# Patient Record
Sex: FEMALE | Race: OTHER | HISPANIC OR LATINO
[De-identification: names, ages, dates, MRNs, and addresses within clinical notes are randomized per-mention and may not be internally consistent; named-entity substitution may affect disease eponyms.]

---

## 2017-03-08 ENCOUNTER — APPOINTMENT (OUTPATIENT)
Dept: INTERNAL MEDICINE | Facility: CLINIC | Age: 72
End: 2017-03-08

## 2017-04-03 ENCOUNTER — APPOINTMENT (OUTPATIENT)
Dept: INTERNAL MEDICINE | Facility: CLINIC | Age: 72
End: 2017-04-03

## 2017-04-03 VITALS — WEIGHT: 149 LBS | SYSTOLIC BLOOD PRESSURE: 119 MMHG | DIASTOLIC BLOOD PRESSURE: 80 MMHG | BODY MASS INDEX: 31.68 KG/M2

## 2017-04-03 VITALS — HEART RATE: 90 BPM

## 2017-05-04 ENCOUNTER — APPOINTMENT (OUTPATIENT)
Dept: OPHTHALMOLOGY | Facility: CLINIC | Age: 72
End: 2017-05-04

## 2017-05-04 DIAGNOSIS — H26.9 UNSPECIFIED CATARACT: ICD-10-CM

## 2017-05-24 ENCOUNTER — FORM ENCOUNTER (OUTPATIENT)
Age: 72
End: 2017-05-24

## 2017-05-25 ENCOUNTER — RX RENEWAL (OUTPATIENT)
Age: 72
End: 2017-05-25

## 2017-05-25 ENCOUNTER — APPOINTMENT (OUTPATIENT)
Dept: MAMMOGRAPHY | Facility: IMAGING CENTER | Age: 72
End: 2017-05-25

## 2017-05-25 ENCOUNTER — OUTPATIENT (OUTPATIENT)
Dept: OUTPATIENT SERVICES | Facility: HOSPITAL | Age: 72
LOS: 1 days | End: 2017-05-25
Payer: MEDICARE

## 2017-05-25 DIAGNOSIS — Z98.49 CATARACT EXTRACTION STATUS, UNSPECIFIED EYE: Chronic | ICD-10-CM

## 2017-05-25 DIAGNOSIS — Z00.00 ENCOUNTER FOR GENERAL ADULT MEDICAL EXAMINATION WITHOUT ABNORMAL FINDINGS: ICD-10-CM

## 2017-05-25 PROCEDURE — 77067 SCR MAMMO BI INCL CAD: CPT

## 2017-05-25 PROCEDURE — 77063 BREAST TOMOSYNTHESIS BI: CPT

## 2017-07-13 DIAGNOSIS — G56.00 CARPAL TUNNEL SYNDROME, UNSPECIFIED UPPER LIMB: ICD-10-CM

## 2017-07-21 ENCOUNTER — APPOINTMENT (OUTPATIENT)
Dept: INTERNAL MEDICINE | Facility: CLINIC | Age: 72
End: 2017-07-21

## 2017-07-21 VITALS
HEART RATE: 82 BPM | OXYGEN SATURATION: 97 % | BODY MASS INDEX: 31.71 KG/M2 | HEIGHT: 57.5 IN | DIASTOLIC BLOOD PRESSURE: 80 MMHG | WEIGHT: 149 LBS | SYSTOLIC BLOOD PRESSURE: 112 MMHG

## 2017-07-21 DIAGNOSIS — M25.562 PAIN IN LEFT KNEE: ICD-10-CM

## 2017-07-21 DIAGNOSIS — Z01.818 ENCOUNTER FOR OTHER PREPROCEDURAL EXAMINATION: ICD-10-CM

## 2017-07-26 LAB
25(OH)D3 SERPL-MCNC: 35.7 NG/ML
ALBUMIN SERPL ELPH-MCNC: 4.2 G/DL
ALP BLD-CCNC: 86 U/L
ALT SERPL-CCNC: 40 U/L
ANION GAP SERPL CALC-SCNC: 19 MMOL/L
AST SERPL-CCNC: 28 U/L
BASOPHILS # BLD AUTO: 0.01 K/UL
BASOPHILS NFR BLD AUTO: 0.2 %
BILIRUB SERPL-MCNC: 0.4 MG/DL
BUN SERPL-MCNC: 17 MG/DL
CALCIUM SERPL-MCNC: 9.1 MG/DL
CHLORIDE SERPL-SCNC: 103 MMOL/L
CHOLEST SERPL-MCNC: 155 MG/DL
CHOLEST/HDLC SERPL: 3.4 RATIO
CO2 SERPL-SCNC: 19 MMOL/L
CREAT SERPL-MCNC: 0.81 MG/DL
EOSINOPHIL # BLD AUTO: 0.12 K/UL
EOSINOPHIL NFR BLD AUTO: 2.5 %
GLUCOSE SERPL-MCNC: 95 MG/DL
HBA1C MFR BLD HPLC: 6 %
HCT VFR BLD CALC: 39.4 %
HDLC SERPL-MCNC: 46 MG/DL
HGB BLD-MCNC: 12.7 G/DL
IMM GRANULOCYTES NFR BLD AUTO: 0.2 %
LDLC SERPL CALC-MCNC: 92 MG/DL
LYMPHOCYTES # BLD AUTO: 1.78 K/UL
LYMPHOCYTES NFR BLD AUTO: 36.4 %
MAN DIFF?: NORMAL
MCHC RBC-ENTMCNC: 28.8 PG
MCHC RBC-ENTMCNC: 32.2 GM/DL
MCV RBC AUTO: 89.3 FL
MONOCYTES # BLD AUTO: 0.33 K/UL
MONOCYTES NFR BLD AUTO: 6.7 %
NEUTROPHILS # BLD AUTO: 2.64 K/UL
NEUTROPHILS NFR BLD AUTO: 54 %
PLATELET # BLD AUTO: 307 K/UL
POTASSIUM SERPL-SCNC: 4.5 MMOL/L
PROT SERPL-MCNC: 7.4 G/DL
RBC # BLD: 4.41 M/UL
RBC # FLD: 15.3 %
SODIUM SERPL-SCNC: 141 MMOL/L
TRIGL SERPL-MCNC: 83 MG/DL
TSH SERPL-ACNC: 1.38 UIU/ML
WBC # FLD AUTO: 4.89 K/UL

## 2017-09-13 ENCOUNTER — APPOINTMENT (OUTPATIENT)
Dept: OPHTHALMOLOGY | Facility: CLINIC | Age: 72
End: 2017-09-13
Payer: MEDICARE

## 2017-09-13 DIAGNOSIS — H40.003 PREGLAUCOMA, UNSPECIFIED, BILATERAL: ICD-10-CM

## 2017-09-13 PROCEDURE — 92012 INTRM OPH EXAM EST PATIENT: CPT

## 2017-09-13 PROCEDURE — ZZZZZ: CPT

## 2017-09-13 PROCEDURE — 92250 FUNDUS PHOTOGRAPHY W/I&R: CPT

## 2017-09-13 PROCEDURE — 92083 EXTENDED VISUAL FIELD XM: CPT

## 2017-09-14 ENCOUNTER — APPOINTMENT (OUTPATIENT)
Dept: OPHTHALMOLOGY | Facility: CLINIC | Age: 72
End: 2017-09-14

## 2017-11-17 ENCOUNTER — APPOINTMENT (OUTPATIENT)
Dept: OPHTHALMOLOGY | Facility: CLINIC | Age: 72
End: 2017-11-17
Payer: MEDICARE

## 2017-11-17 PROCEDURE — 92012 INTRM OPH EXAM EST PATIENT: CPT

## 2017-11-17 PROCEDURE — 92083 EXTENDED VISUAL FIELD XM: CPT

## 2017-11-24 ENCOUNTER — RX RENEWAL (OUTPATIENT)
Age: 72
End: 2017-11-24

## 2017-11-28 ENCOUNTER — APPOINTMENT (OUTPATIENT)
Dept: OPHTHALMOLOGY | Facility: CLINIC | Age: 72
End: 2017-11-28
Payer: MEDICARE

## 2017-11-28 PROCEDURE — 92012 INTRM OPH EXAM EST PATIENT: CPT

## 2017-11-28 PROCEDURE — 92133 CPTRZD OPH DX IMG PST SGM ON: CPT

## 2017-11-28 PROCEDURE — 92083 EXTENDED VISUAL FIELD XM: CPT

## 2017-12-28 ENCOUNTER — APPOINTMENT (OUTPATIENT)
Dept: INTERNAL MEDICINE | Facility: CLINIC | Age: 72
End: 2017-12-28
Payer: MEDICARE

## 2017-12-28 VITALS — SYSTOLIC BLOOD PRESSURE: 120 MMHG | DIASTOLIC BLOOD PRESSURE: 79 MMHG | HEART RATE: 86 BPM | OXYGEN SATURATION: 96 %

## 2017-12-28 PROCEDURE — 99213 OFFICE O/P EST LOW 20 MIN: CPT

## 2017-12-28 RX ORDER — AMOXICILLIN 500 MG/1
500 CAPSULE ORAL
Qty: 21 | Refills: 0 | Status: DISCONTINUED | COMMUNITY
Start: 2017-08-17 | End: 2017-12-28

## 2018-05-29 ENCOUNTER — RX RENEWAL (OUTPATIENT)
Age: 73
End: 2018-05-29

## 2018-06-04 ENCOUNTER — APPOINTMENT (OUTPATIENT)
Dept: INTERNAL MEDICINE | Facility: CLINIC | Age: 73
End: 2018-06-04
Payer: MEDICARE

## 2018-06-04 VITALS
HEART RATE: 94 BPM | OXYGEN SATURATION: 98 % | SYSTOLIC BLOOD PRESSURE: 120 MMHG | DIASTOLIC BLOOD PRESSURE: 80 MMHG | WEIGHT: 150 LBS | HEIGHT: 57.5 IN | BODY MASS INDEX: 31.92 KG/M2

## 2018-06-04 PROCEDURE — 99214 OFFICE O/P EST MOD 30 MIN: CPT

## 2018-06-10 NOTE — PHYSICAL EXAM
[No Acute Distress] : no acute distress [Well Nourished] : well nourished [Well Developed] : well developed [Well-Appearing] : well-appearing [Supple] : supple [No Respiratory Distress] : no respiratory distress  [Clear to Auscultation] : lungs were clear to auscultation bilaterally [No Accessory Muscle Use] : no accessory muscle use [Normal Rate] : normal rate  [Regular Rhythm] : with a regular rhythm [Normal S1, S2] : normal S1 and S2 [No Murmur] : no murmur heard [Soft] : abdomen soft [Non Tender] : non-tender [Non-distended] : non-distended [No Masses] : no abdominal mass palpated [No HSM] : no HSM [Normal Bowel Sounds] : normal bowel sounds

## 2018-06-11 ENCOUNTER — FORM ENCOUNTER (OUTPATIENT)
Age: 73
End: 2018-06-11

## 2018-06-12 ENCOUNTER — OUTPATIENT (OUTPATIENT)
Dept: OUTPATIENT SERVICES | Facility: HOSPITAL | Age: 73
LOS: 1 days | End: 2018-06-12
Payer: MEDICARE

## 2018-06-12 ENCOUNTER — APPOINTMENT (OUTPATIENT)
Dept: MAMMOGRAPHY | Facility: IMAGING CENTER | Age: 73
End: 2018-06-12
Payer: MEDICARE

## 2018-06-12 DIAGNOSIS — Z00.8 ENCOUNTER FOR OTHER GENERAL EXAMINATION: ICD-10-CM

## 2018-06-12 DIAGNOSIS — Z98.49 CATARACT EXTRACTION STATUS, UNSPECIFIED EYE: Chronic | ICD-10-CM

## 2018-06-12 PROCEDURE — 77067 SCR MAMMO BI INCL CAD: CPT

## 2018-06-12 PROCEDURE — 77063 BREAST TOMOSYNTHESIS BI: CPT

## 2018-06-12 PROCEDURE — 77063 BREAST TOMOSYNTHESIS BI: CPT | Mod: 26

## 2018-06-12 PROCEDURE — 77067 SCR MAMMO BI INCL CAD: CPT | Mod: 26

## 2018-07-06 NOTE — ASSESSMENT
[FreeTextEntry1] : 71 yo female with h/o as above including hypothyroidism and osteopenia here for f/up and to address a few issues.\par 1.  GI -intermittent GERD without warning signs except for age, but not occurring all the time; would advise trial pepcid, referred to GI DR. Benitez/group for colonoscopy and possible EGD\par 2. Gyn - rx mammo given\par 3.  Endo - rx dexa given to f/up osteopenia\par 4.  MSK - will f/up with ortho for foot/ankle issues\par 5.  RTO few months CPE when due\par \par \par

## 2018-07-06 NOTE — HISTORY OF PRESENT ILLNESS
[FreeTextEntry8] : 73 yo female with h/o as below including hypothyroidism, preDM, osteopenia, here for few issues.\par For past four months has been developing heartburn, occurs with certain foods, including tuna fish, won't occur with other foods.  Takes tums with relief.  No abdominal pain, no nausea/vomiting, no hematochezia, no melena, no dysphagia, no odynophagia, no weight loss, no early satiety.  No travel out of the country.  No contacts with h pylori.  \par Needs rx mammo, not sure if needs dexa, also needs referral for colonoscopy.\par Went to orthopedist for pain in left foot (hurts when using flats), and had cut bottom of toe so given cream by ortho, but still has cut, not sure if cream helped.  Then developed swelling left ankle, not sure what it is, not painful.  In the past given brace by ortho to prevent tendinitis on right foot.  \par No other active issues.\par

## 2018-07-06 NOTE — REVIEW OF SYSTEMS
[Negative] : Heme/Lymph [FreeTextEntry7] : see hpi [FreeTextEntry9] : see hpi [de-identified] : see hpi

## 2018-08-26 ENCOUNTER — EMERGENCY (EMERGENCY)
Facility: HOSPITAL | Age: 73
LOS: 1 days | End: 2018-08-26
Attending: EMERGENCY MEDICINE
Payer: MEDICARE

## 2018-08-26 VITALS — WEIGHT: 139.99 LBS | HEIGHT: 62 IN

## 2018-08-26 DIAGNOSIS — Z98.49 CATARACT EXTRACTION STATUS, UNSPECIFIED EYE: Chronic | ICD-10-CM

## 2018-08-26 LAB
ALBUMIN SERPL ELPH-MCNC: 4.1 G/DL — SIGNIFICANT CHANGE UP (ref 3.3–5)
ALP SERPL-CCNC: 79 U/L — SIGNIFICANT CHANGE UP (ref 40–120)
ALT FLD-CCNC: 38 U/L — SIGNIFICANT CHANGE UP (ref 10–45)
ANION GAP SERPL CALC-SCNC: 13 MMOL/L — SIGNIFICANT CHANGE UP (ref 5–17)
APPEARANCE UR: ABNORMAL
AST SERPL-CCNC: 26 U/L — SIGNIFICANT CHANGE UP (ref 10–40)
BACTERIA # UR AUTO: ABNORMAL /HPF
BASOPHILS # BLD AUTO: 0 K/UL — SIGNIFICANT CHANGE UP (ref 0–0.2)
BASOPHILS NFR BLD AUTO: 0.6 % — SIGNIFICANT CHANGE UP (ref 0–2)
BILIRUB SERPL-MCNC: 0.4 MG/DL — SIGNIFICANT CHANGE UP (ref 0.2–1.2)
BILIRUB UR-MCNC: NEGATIVE — SIGNIFICANT CHANGE UP
BUN SERPL-MCNC: 14 MG/DL — SIGNIFICANT CHANGE UP (ref 7–23)
CALCIUM SERPL-MCNC: 9.6 MG/DL — SIGNIFICANT CHANGE UP (ref 8.4–10.5)
CHLORIDE SERPL-SCNC: 105 MMOL/L — SIGNIFICANT CHANGE UP (ref 96–108)
CO2 SERPL-SCNC: 23 MMOL/L — SIGNIFICANT CHANGE UP (ref 22–31)
COLOR SPEC: SIGNIFICANT CHANGE UP
COMMENT - URINE: SIGNIFICANT CHANGE UP
CREAT SERPL-MCNC: 0.85 MG/DL — SIGNIFICANT CHANGE UP (ref 0.5–1.3)
DIFF PNL FLD: ABNORMAL
EOSINOPHIL # BLD AUTO: 0.1 K/UL — SIGNIFICANT CHANGE UP (ref 0–0.5)
EOSINOPHIL NFR BLD AUTO: 1.8 % — SIGNIFICANT CHANGE UP (ref 0–6)
EPI CELLS # UR: SIGNIFICANT CHANGE UP /HPF
GAS PNL BLDV: SIGNIFICANT CHANGE UP
GLUCOSE SERPL-MCNC: 97 MG/DL — SIGNIFICANT CHANGE UP (ref 70–99)
GLUCOSE UR QL: NEGATIVE — SIGNIFICANT CHANGE UP
HCT VFR BLD CALC: 41 % — SIGNIFICANT CHANGE UP (ref 34.5–45)
HGB BLD-MCNC: 13.6 G/DL — SIGNIFICANT CHANGE UP (ref 11.5–15.5)
KETONES UR-MCNC: NEGATIVE — SIGNIFICANT CHANGE UP
LEUKOCYTE ESTERASE UR-ACNC: ABNORMAL
LYMPHOCYTES # BLD AUTO: 2 K/UL — SIGNIFICANT CHANGE UP (ref 1–3.3)
LYMPHOCYTES # BLD AUTO: 30.6 % — SIGNIFICANT CHANGE UP (ref 13–44)
MCHC RBC-ENTMCNC: 29 PG — SIGNIFICANT CHANGE UP (ref 27–34)
MCHC RBC-ENTMCNC: 33.2 GM/DL — SIGNIFICANT CHANGE UP (ref 32–36)
MCV RBC AUTO: 87.2 FL — SIGNIFICANT CHANGE UP (ref 80–100)
MONOCYTES # BLD AUTO: 0.4 K/UL — SIGNIFICANT CHANGE UP (ref 0–0.9)
MONOCYTES NFR BLD AUTO: 6.1 % — SIGNIFICANT CHANGE UP (ref 2–14)
NEUTROPHILS # BLD AUTO: 4 K/UL — SIGNIFICANT CHANGE UP (ref 1.8–7.4)
NEUTROPHILS NFR BLD AUTO: 60.9 % — SIGNIFICANT CHANGE UP (ref 43–77)
NITRITE UR-MCNC: NEGATIVE — SIGNIFICANT CHANGE UP
PH UR: 7 — SIGNIFICANT CHANGE UP (ref 5–8)
PLATELET # BLD AUTO: 294 K/UL — SIGNIFICANT CHANGE UP (ref 150–400)
POTASSIUM SERPL-MCNC: 4.3 MMOL/L — SIGNIFICANT CHANGE UP (ref 3.5–5.3)
POTASSIUM SERPL-SCNC: 4.3 MMOL/L — SIGNIFICANT CHANGE UP (ref 3.5–5.3)
PROT SERPL-MCNC: 7.9 G/DL — SIGNIFICANT CHANGE UP (ref 6–8.3)
PROT UR-MCNC: NEGATIVE — SIGNIFICANT CHANGE UP
RBC # BLD: 4.7 M/UL — SIGNIFICANT CHANGE UP (ref 3.8–5.2)
RBC # FLD: 12.8 % — SIGNIFICANT CHANGE UP (ref 10.3–14.5)
RBC CASTS # UR COMP ASSIST: SIGNIFICANT CHANGE UP /HPF (ref 0–2)
SODIUM SERPL-SCNC: 141 MMOL/L — SIGNIFICANT CHANGE UP (ref 135–145)
SP GR SPEC: 1.01 — LOW (ref 1.01–1.02)
UROBILINOGEN FLD QL: NEGATIVE — SIGNIFICANT CHANGE UP
WBC # BLD: 6.6 K/UL — SIGNIFICANT CHANGE UP (ref 3.8–10.5)
WBC # FLD AUTO: 6.6 K/UL — SIGNIFICANT CHANGE UP (ref 3.8–10.5)
WBC UR QL: >50 /HPF (ref 0–5)

## 2018-08-26 PROCEDURE — 74176 CT ABD & PELVIS W/O CONTRAST: CPT | Mod: 26

## 2018-08-26 PROCEDURE — 99218: CPT

## 2018-08-26 RX ORDER — LEVOTHYROXINE SODIUM 125 MCG
75 TABLET ORAL DAILY
Qty: 0 | Refills: 0 | Status: DISCONTINUED | OUTPATIENT
Start: 2018-08-26 | End: 2018-08-30

## 2018-08-26 RX ORDER — SODIUM CHLORIDE 9 MG/ML
3 INJECTION INTRAMUSCULAR; INTRAVENOUS; SUBCUTANEOUS EVERY 8 HOURS
Qty: 0 | Refills: 0 | Status: DISCONTINUED | OUTPATIENT
Start: 2018-08-26 | End: 2018-08-30

## 2018-08-26 RX ORDER — ACETAMINOPHEN 500 MG
975 TABLET ORAL ONCE
Qty: 0 | Refills: 0 | Status: COMPLETED | OUTPATIENT
Start: 2018-08-26 | End: 2018-08-26

## 2018-08-26 RX ORDER — CEFTRIAXONE 500 MG/1
1 INJECTION, POWDER, FOR SOLUTION INTRAMUSCULAR; INTRAVENOUS EVERY 12 HOURS
Qty: 0 | Refills: 0 | Status: DISCONTINUED | OUTPATIENT
Start: 2018-08-27 | End: 2018-08-30

## 2018-08-26 RX ORDER — CEFTRIAXONE 500 MG/1
1 INJECTION, POWDER, FOR SOLUTION INTRAMUSCULAR; INTRAVENOUS ONCE
Qty: 0 | Refills: 0 | Status: COMPLETED | OUTPATIENT
Start: 2018-08-26 | End: 2018-08-26

## 2018-08-26 RX ORDER — SODIUM CHLORIDE 9 MG/ML
1000 INJECTION INTRAMUSCULAR; INTRAVENOUS; SUBCUTANEOUS ONCE
Qty: 0 | Refills: 0 | Status: COMPLETED | OUTPATIENT
Start: 2018-08-26 | End: 2018-08-26

## 2018-08-26 RX ADMIN — CEFTRIAXONE 100 GRAM(S): 500 INJECTION, POWDER, FOR SOLUTION INTRAMUSCULAR; INTRAVENOUS at 14:34

## 2018-08-26 RX ADMIN — SODIUM CHLORIDE 3 MILLILITER(S): 9 INJECTION INTRAMUSCULAR; INTRAVENOUS; SUBCUTANEOUS at 22:01

## 2018-08-26 RX ADMIN — Medication 975 MILLIGRAM(S): at 14:32

## 2018-08-26 RX ADMIN — SODIUM CHLORIDE 2000 MILLILITER(S): 9 INJECTION INTRAMUSCULAR; INTRAVENOUS; SUBCUTANEOUS at 12:05

## 2018-08-26 RX ADMIN — SODIUM CHLORIDE 1000 MILLILITER(S): 9 INJECTION INTRAMUSCULAR; INTRAVENOUS; SUBCUTANEOUS at 15:00

## 2018-08-26 RX ADMIN — Medication 975 MILLIGRAM(S): at 12:05

## 2018-08-26 NOTE — ED ADULT NURSE NOTE - OBJECTIVE STATEMENT
73 y/o female presenting to the ED via walking in complaining of left flank pain x 4 days. Patient states developed burning with urination 4 days ago along with left flank pain that has been getting progressively more painful. Hx of osteopenia on Calcium, Sjogren's, and hypothyroid. Patient denies fevers, n/v/d, numbness, tingling, SOB, chest pain. Denies blood in urine. A&OX3. Safety and comfort measures provided.

## 2018-08-26 NOTE — ED ADULT NURSE NOTE - NSIMPLEMENTINTERV_GEN_ALL_ED
Implemented All Universal Safety Interventions:  [place] to call system. Call bell, personal items and telephone within reach. Instruct patient to call for assistance. Room bathroom lighting operational. Non-slip footwear when patient is off stretcher. Physically safe environment: no spills, clutter or unnecessary equipment. Stretcher in lowest position, wheels locked, appropriate side rails in place.

## 2018-08-26 NOTE — ED PROVIDER NOTE - MEDICAL DECISION MAKING DETAILS
Woody Bassett (Resident): Healthy 73 y/o p/w flank pain and dysuria - looks well, non toxic - concern for pyelo but given nature of pain patient describing, will CT to ensure not a septic stone - will check labs, urine, pain control - low concern for dissection given history, normotensive and dysuria - will be a good cdu candidate vs d/c if simple pyelo

## 2018-08-26 NOTE — ED CDU PROVIDER INITIAL DAY NOTE - ATTENDING CONTRIBUTION TO CARE
attending Brandie: pt with pyelonephritis. Plan for CDU for pain control, IV abx, vital signs q4h and  frequent re-evaluations

## 2018-08-26 NOTE — ED CDU PROVIDER INITIAL DAY NOTE - OBJECTIVE STATEMENT
71 y/o female with PMH of osteopenia on Calcium, Sjogren's syndrome, and hypothyroid presents with flan pain. Per patient, developed some pain in her L flank - tried applying Bengay and taking tylenol w/o relief. Pain started 4 days ago, and now has gotten worse. States she has burning on urination as well. Was unable to get PMD appointment, so been taking Azo. Still having burning on urination. Denies f/c, CP, SOB, N/V/D. No hx of renal colic.   PMD: Dr. Nereyda Coleman    In the ED VSS. Labs unremarkable.  UA >50 WBC and large leuks.  CTAP negative for stone.  Patient started on ceftriaxone.  Plan to place in the CDU overnight for IV antibiotics and close observation

## 2018-08-26 NOTE — ED CDU PROVIDER INITIAL DAY NOTE - PROGRESS NOTE DETAILS
Patient seen at bedside in NAD.  VSS.  Patient resting comfortably without complaints.  Pain well controlled. Afebrile.  -Migue Navarrete PA-C CDU PROGRESS NOTE SHARAD MORE: Received pt at 1900 sign-out. Pt resting in stretcher in NAD. Case/plan reviewed. VSS. Pt Ambulatory around unit with steady gait. S1 S2 noted, RRR, lungs CTA b/l, BS x4 with soft, nontender abdomen. Pt without complaints, declines pain medication. Will continue to monitor overnight. CDU PROGRESS NOTE PA DERIK: Pt resting comfortably, feeling well without complaint. NAD, VSS

## 2018-08-26 NOTE — ED PROVIDER NOTE - ATTENDING CONTRIBUTION TO CARE
attending Brandie: 72yF p/w several days flank pain and dysuria. Well-appearing on exam. Not septic. Likely pyelo, will also get CT eval for stone and reassess

## 2018-08-26 NOTE — ED PROVIDER NOTE - OBJECTIVE STATEMENT
71 y/o female with PMH of osteopenia on Calcium, Sjogren's syndrome, and hypothyroid presents with flan pain. Per patient, developed some pain in her L flank - tried applying Bengay and taking tylenol w/o relief. Pain started 4 days ago, and now has gotten worse. States she has burning on urination as well. Was unable to get PMD appointment, so been taking Azo. Still having burning on urination. Denies f/c, CP, SOB, N/V/D. No hx of renal colic.   PMD: Dr. Nereyda Coleman

## 2018-08-27 VITALS
RESPIRATION RATE: 18 BRPM | HEART RATE: 66 BPM | TEMPERATURE: 98 F | SYSTOLIC BLOOD PRESSURE: 126 MMHG | DIASTOLIC BLOOD PRESSURE: 84 MMHG | OXYGEN SATURATION: 96 %

## 2018-08-27 PROCEDURE — 99217: CPT

## 2018-08-27 PROCEDURE — 96376 TX/PRO/DX INJ SAME DRUG ADON: CPT

## 2018-08-27 PROCEDURE — 99284 EMERGENCY DEPT VISIT MOD MDM: CPT | Mod: 25

## 2018-08-27 PROCEDURE — 84295 ASSAY OF SERUM SODIUM: CPT

## 2018-08-27 PROCEDURE — 85014 HEMATOCRIT: CPT

## 2018-08-27 PROCEDURE — 82330 ASSAY OF CALCIUM: CPT

## 2018-08-27 PROCEDURE — 83605 ASSAY OF LACTIC ACID: CPT

## 2018-08-27 PROCEDURE — 80053 COMPREHEN METABOLIC PANEL: CPT

## 2018-08-27 PROCEDURE — 87086 URINE CULTURE/COLONY COUNT: CPT

## 2018-08-27 PROCEDURE — 81001 URINALYSIS AUTO W/SCOPE: CPT

## 2018-08-27 PROCEDURE — G0378: CPT

## 2018-08-27 PROCEDURE — 82803 BLOOD GASES ANY COMBINATION: CPT

## 2018-08-27 PROCEDURE — 96361 HYDRATE IV INFUSION ADD-ON: CPT

## 2018-08-27 PROCEDURE — 87186 SC STD MICRODIL/AGAR DIL: CPT

## 2018-08-27 PROCEDURE — 82947 ASSAY GLUCOSE BLOOD QUANT: CPT

## 2018-08-27 PROCEDURE — 82435 ASSAY OF BLOOD CHLORIDE: CPT

## 2018-08-27 PROCEDURE — 85027 COMPLETE CBC AUTOMATED: CPT

## 2018-08-27 PROCEDURE — 96365 THER/PROPH/DIAG IV INF INIT: CPT

## 2018-08-27 PROCEDURE — 74176 CT ABD & PELVIS W/O CONTRAST: CPT

## 2018-08-27 PROCEDURE — 84132 ASSAY OF SERUM POTASSIUM: CPT

## 2018-08-27 RX ORDER — CEFUROXIME AXETIL 250 MG
3 TABLET ORAL
Qty: 90 | Refills: 0 | OUTPATIENT
Start: 2018-08-27 | End: 2018-09-05

## 2018-08-27 RX ORDER — CEFUROXIME AXETIL 250 MG
1 TABLET ORAL
Qty: 30 | Refills: 0
Start: 2018-08-27 | End: 2018-09-05

## 2018-08-27 RX ADMIN — Medication 75 MICROGRAM(S): at 06:56

## 2018-08-27 RX ADMIN — SODIUM CHLORIDE 3 MILLILITER(S): 9 INJECTION INTRAMUSCULAR; INTRAVENOUS; SUBCUTANEOUS at 05:14

## 2018-08-27 RX ADMIN — CEFTRIAXONE 1 GRAM(S): 500 INJECTION, POWDER, FOR SOLUTION INTRAMUSCULAR; INTRAVENOUS at 03:08

## 2018-08-27 RX ADMIN — CEFTRIAXONE 100 GRAM(S): 500 INJECTION, POWDER, FOR SOLUTION INTRAMUSCULAR; INTRAVENOUS at 02:38

## 2018-08-27 NOTE — ED CDU PROVIDER DISPOSITION NOTE - PLAN OF CARE
1. Follow up with your PMD within 48-72 hours.   2. Take ceftin 500mg TID for 10 days. Increase fluids. Motrin 600mg every 8 hours with food for pain.   3. Worsening pain, new fever, chills, nausea, vomiting return to ER

## 2018-08-27 NOTE — ED CDU PROVIDER SUBSEQUENT DAY NOTE - MEDICAL DECISION MAKING DETAILS
Attending Fabian Ordonez DO: 71 yo female admitted to CDU for flank pain and dysuria, +UA, being treated for pyelonephritis. Pt states "I feel better:. Has no complaints. VSS. PE: Well appearing, NAD, MMM, RRR, lungs clear b/l, ab soft, NT/ND, no CVA tenderness, moving all extremities without pain or limitation, no focal neuro deficits, no rashes, normal mood and affect. Labs unremarkable. Will dc home on antibiotics.

## 2018-08-27 NOTE — ED CDU PROVIDER SUBSEQUENT DAY NOTE - HISTORY
No interval change from previous note. Pt resting comfortably, feeling well without complaint. NAD, VSS.

## 2018-08-27 NOTE — ED CDU PROVIDER SUBSEQUENT DAY NOTE - ENMT NEGATIVE STATEMENT, MLM
Ears: no ear pain and no hearing problems.Nose: no nasal congestion and no nasal drainage.Mouth/Throat: no dysphagia, no hoarseness and no throat pain.Neck: no lumps, no pain, no stiffness and no swollen glands Ears: no ear pain and no hearing problems. Nose: no nasal congestion and no nasal drainage.Mouth/Throat: no dysphagia, no hoarseness and no throat pain.Neck: no lumps, no pain, no stiffness and no swollen glands

## 2018-08-27 NOTE — ED CDU PROVIDER SUBSEQUENT DAY NOTE - PROGRESS NOTE DETAILS
No interval change from previous note. Pt resting comfortably, feeling well without complaint. NAD, VSS. Patient resting, NAD, received Ceftriaxone, without complaints, will continue to monitor. Patient resting, no back pain or dysuria, feeling well, tolerating PO intake. will continue to monitor, will d/c with PMD follow up and ceftin 500 TID for 10 days

## 2018-08-27 NOTE — ED CDU PROVIDER DISPOSITION NOTE - CLINICAL COURSE
71 y/o female with PMH of osteopenia on Calcium, Sjogren's syndrome, and hypothyroid presents with flan pain. Per patient, developed some pain in her L flank - tried applying Bengay and taking tylenol w/o relief. Pain started 4 days ago, and now has gotten worse. States she has burning on urination as well. Was unable to get PMD appointment, so been taking Azo. Still having burning on urination. Denies f/c, CP, SOB, N/V/D. No hx of renal colic.   PMD: Dr. Nereyda Coleman  In the ED VSS. Labs unremarkable.  UA >50 WBC and large leuks.  CTAP negative for stone.  Patient started on ceftriaxone.  Plan to place in the CDU overnight for IV antibiotics and close observation 71 y/o female with PMH of osteopenia on Calcium, Sjogren's syndrome, and hypothyroid presents with flank pain. Per patient, developed some pain in her L flank - tried applying Bengay and taking tylenol w/o relief. Pain started 4 days ago, and now has gotten worse. States she has burning on urination as well. Was unable to get PMD appointment, so been taking Azo. Still having burning on urination. Denies f/c, CP, SOB, N/V/D. No hx of renal colic.   PMD: Dr. Nereyda Coleman  In the ED VSS. Labs unremarkable.  UA >50 WBC and large leuks.  CTAP negative for stone.  Patient started on ceftriaxone.  Plan to place in the CDU overnight for IV antibiotics and close observation 73 y/o female with PMH of osteopenia on Calcium, Sjogren's syndrome, and hypothyroid presents with flank pain. Per patient, developed some pain in her L flank - tried applying Bengay and taking tylenol w/o relief. Pain started 4 days ago, and now has gotten worse. States she has burning on urination as well. Was unable to get PMD appointment, so been taking Azo. Still having burning on urination. Denies f/c, CP, SOB, N/V/D. No hx of renal colic.   PMD: Dr. Nereyda Coleman  In the ED VSS. Labs unremarkable.  UA >50 WBC and large leuks.  CTAP negative for stone.  Patient started on ceftriaxone.  Plan to place in the CDU overnight for IV antibiotics and close observation    Pt feeling well overnight, no back pain, no dysuria, tolerating PO well. Will f.u with PMD and ceftin 500 TID for 10 days, case discussed with Dr. Ordonez.

## 2018-08-27 NOTE — ED ADULT NURSE REASSESSMENT NOTE - NS ED NURSE REASSESS COMMENT FT1
10.00 Pt was Reviewed by Dr José Luis TOLEDO MD  Pt is Discharged  ML out SHARAD Velez explained the  Follow up care & gave the Discharge Summary Pt verbalized the understanding on follow up care Pt has stable vitals steady gait A&OX4  Stable to go home
15.30 Hrs Received  the pt from erik for the management of pyonephritis. Pt is oriented to the unit.  Has stable vitals afebrile here   . Pt states her pain under control . comfort care & safety measures initiated.  Continue to monitor
Pt received from PHILIPP Montejo. Pt oriented to CDU & plan of care was discussed. Pt denies any discomfort urinating, frequency, or any tenderness. Urine is yellow and non cloudy. Will give IV abx at 0230 as per MAR. Safety & comfort measures maintained. Call bell in reach. Will continue to monitor.
07.00 Am Received Report from PHILIPP Arauz/David Schmitz  07.30 Am Pt reassessed Pt A&OX4 ambulatory has steady gait  denies N /V/D fever chills CP sob pain has stable vitals afebrile here IV site looks clean dry intact no signs of infiltration noted  Comfort care & safety measures continued  Pt is awaiting for CDU eval & Decision

## 2018-08-29 ENCOUNTER — APPOINTMENT (OUTPATIENT)
Dept: INTERNAL MEDICINE | Facility: CLINIC | Age: 73
End: 2018-08-29
Payer: MEDICARE

## 2018-08-29 PROCEDURE — 99213 OFFICE O/P EST LOW 20 MIN: CPT

## 2018-08-29 NOTE — ED POST DISCHARGE NOTE - RESULT SUMMARY
UCx + E.coli 50-99k, Sensitive to 2nd gen cephalosporins, pt DC'ed on cefuroxime. Pt on appropriate abx, no need for further contact. -Manoj Dang PA-C

## 2018-08-30 VITALS
TEMPERATURE: 98.6 F | RESPIRATION RATE: 14 BRPM | DIASTOLIC BLOOD PRESSURE: 70 MMHG | HEART RATE: 78 BPM | SYSTOLIC BLOOD PRESSURE: 110 MMHG

## 2018-10-03 ENCOUNTER — APPOINTMENT (OUTPATIENT)
Dept: INTERNAL MEDICINE | Facility: CLINIC | Age: 73
End: 2018-10-03
Payer: MEDICARE

## 2018-10-03 VITALS
HEIGHT: 57.25 IN | SYSTOLIC BLOOD PRESSURE: 120 MMHG | WEIGHT: 143 LBS | DIASTOLIC BLOOD PRESSURE: 70 MMHG | HEART RATE: 78 BPM | BODY MASS INDEX: 30.85 KG/M2 | OXYGEN SATURATION: 98 %

## 2018-10-03 DIAGNOSIS — M79.673 PAIN IN UNSPECIFIED FOOT: ICD-10-CM

## 2018-10-03 PROCEDURE — G0439: CPT | Mod: 25

## 2018-10-03 PROCEDURE — 90662 IIV NO PRSV INCREASED AG IM: CPT

## 2018-10-03 PROCEDURE — G0008: CPT

## 2018-10-03 RX ORDER — IBUPROFEN 400 MG/1
400 TABLET, FILM COATED ORAL
Qty: 28 | Refills: 0 | Status: DISCONTINUED | COMMUNITY
Start: 2017-08-17 | End: 2018-10-03

## 2018-10-03 RX ORDER — CEFUROXIME AXETIL 500 MG/1
500 TABLET ORAL
Qty: 30 | Refills: 0 | Status: DISCONTINUED | COMMUNITY
Start: 2018-08-27 | End: 2018-10-03

## 2018-10-03 NOTE — PHYSICAL EXAM
[No Acute Distress] : no acute distress [Well Nourished] : well nourished [Well Developed] : well developed [Well-Appearing] : well-appearing [PERRL] : pupils equal round and reactive to light [Normal Oropharynx] : the oropharynx was normal [Normal TMs] : both tympanic membranes were normal [Normal Nasal Mucosa] : the nasal mucosa was normal [Supple] : supple [No Lymphadenopathy] : no lymphadenopathy [Thyroid Normal, No Nodules] : the thyroid was normal and there were no nodules present [No Respiratory Distress] : no respiratory distress  [Clear to Auscultation] : lungs were clear to auscultation bilaterally [No Accessory Muscle Use] : no accessory muscle use [Normal Rate] : normal rate  [Regular Rhythm] : with a regular rhythm [Normal S1, S2] : normal S1 and S2 [No Murmur] : no murmur heard [No Carotid Bruits] : no carotid bruits [Pedal Pulses Present] : the pedal pulses are present [No Edema] : there was no peripheral edema [Normal Appearance] : normal in appearance [No Nipple Discharge] : no nipple discharge [No Axillary Lymphadenopathy] : no axillary lymphadenopathy [Soft] : abdomen soft [Non Tender] : non-tender [Non-distended] : non-distended [No Masses] : no abdominal mass palpated [No HSM] : no HSM [Normal Bowel Sounds] : normal bowel sounds [Normal Supraclavicular Nodes] : no supraclavicular lymphadenopathy [Normal Axillary Nodes] : no axillary lymphadenopathy [Normal Posterior Cervical Nodes] : no posterior cervical lymphadenopathy [Normal Anterior Cervical Nodes] : no anterior cervical lymphadenopathy [Normal Inguinal Nodes] : no inguinal lymphadenopathy [No Joint Swelling] : no joint swelling [No Rash] : no rash [Normal Gait] : normal gait [Normal Affect] : the affect was normal

## 2018-10-15 ENCOUNTER — APPOINTMENT (OUTPATIENT)
Dept: INTERNAL MEDICINE | Facility: CLINIC | Age: 73
End: 2018-10-15
Payer: MEDICARE

## 2018-10-15 VITALS
WEIGHT: 142 LBS | BODY MASS INDEX: 30.63 KG/M2 | DIASTOLIC BLOOD PRESSURE: 80 MMHG | OXYGEN SATURATION: 96 % | HEIGHT: 57 IN | HEART RATE: 93 BPM | SYSTOLIC BLOOD PRESSURE: 132 MMHG

## 2018-10-15 PROCEDURE — 99213 OFFICE O/P EST LOW 20 MIN: CPT

## 2018-10-15 NOTE — HISTORY OF PRESENT ILLNESS
[FreeTextEntry8] : Pt reports pain intermittent, like a pressure in the left LQ region for the past 1 week\par -no accident/injury/trauma noted\par -no dysuria or hematuria, no frequency\par -pain is worse with walking\par -pain is better with resting\par -pt states she is very active at work going up stairs etc

## 2018-10-15 NOTE — PHYSICAL EXAM
[No Acute Distress] : no acute distress [Well-Appearing] : well-appearing [de-identified] : LEFT lateral hip, inguinal region, no pain to palpation, no joint swelling, no pain to palpation over the region, straight leg raising negative bilaterally [de-identified] : no skin changes over the left lateral hip region where pt has pain

## 2018-10-15 NOTE — REVIEW OF SYSTEMS
[Joint Pain] : joint pain [Muscle Pain] : muscle pain [Fever] : no fever [Dysuria] : no dysuria [Hematuria] : no hematuria

## 2018-11-05 LAB
25(OH)D3 SERPL-MCNC: 36.1 NG/ML
CHOLEST SERPL-MCNC: 144 MG/DL
CHOLEST/HDLC SERPL: 3.8 RATIO
HBA1C MFR BLD HPLC: 5.9 %
HDLC SERPL-MCNC: 38 MG/DL
LDLC SERPL CALC-MCNC: 85 MG/DL
TRIGL SERPL-MCNC: 105 MG/DL
TSH SERPL-ACNC: 0.4 UIU/ML

## 2018-11-12 ENCOUNTER — APPOINTMENT (OUTPATIENT)
Dept: GASTROENTEROLOGY | Facility: CLINIC | Age: 73
End: 2018-11-12
Payer: MEDICARE

## 2018-11-12 VITALS
TEMPERATURE: 98.2 F | RESPIRATION RATE: 15 BRPM | HEIGHT: 55 IN | HEART RATE: 90 BPM | OXYGEN SATURATION: 98 % | SYSTOLIC BLOOD PRESSURE: 124 MMHG | DIASTOLIC BLOOD PRESSURE: 76 MMHG | WEIGHT: 140 LBS | BODY MASS INDEX: 32.4 KG/M2

## 2018-11-12 DIAGNOSIS — Z86.010 PERSONAL HISTORY OF COLONIC POLYPS: ICD-10-CM

## 2018-11-12 PROCEDURE — 99203 OFFICE O/P NEW LOW 30 MIN: CPT

## 2018-11-18 ENCOUNTER — FORM ENCOUNTER (OUTPATIENT)
Age: 73
End: 2018-11-18

## 2018-11-19 ENCOUNTER — APPOINTMENT (OUTPATIENT)
Dept: RADIOLOGY | Facility: IMAGING CENTER | Age: 73
End: 2018-11-19
Payer: MEDICARE

## 2018-11-19 ENCOUNTER — OUTPATIENT (OUTPATIENT)
Dept: OUTPATIENT SERVICES | Facility: HOSPITAL | Age: 73
LOS: 1 days | End: 2018-11-19
Payer: MEDICARE

## 2018-11-19 DIAGNOSIS — Z98.49 CATARACT EXTRACTION STATUS, UNSPECIFIED EYE: Chronic | ICD-10-CM

## 2018-11-19 DIAGNOSIS — M85.80 OTHER SPECIFIED DISORDERS OF BONE DENSITY AND STRUCTURE, UNSPECIFIED SITE: ICD-10-CM

## 2018-11-19 PROCEDURE — 77080 DXA BONE DENSITY AXIAL: CPT

## 2018-11-19 PROCEDURE — 77080 DXA BONE DENSITY AXIAL: CPT | Mod: 26

## 2018-11-24 NOTE — HISTORY OF PRESENT ILLNESS
[FreeTextEntry1] : physical [de-identified] : 72 yo female with h/o as below here for CPE.\par Since last year, was hospitalized for UTI, given IV abx and transitioned to po abx, had some transient LLQ pain that resolved, CT A/P unrevealing except scattered diverticula, tiny fat containing umbilical hernia, stable LLL lung nodules, had nl CBC and CMP.\par Feeling well, no complaints.

## 2018-11-24 NOTE — REVIEW OF SYSTEMS
[Recent Change In Weight] : ~T recent weight change [Joint Pain] : joint pain [Negative] : ENT [Fever] : no fever [Chills] : no chills [Fatigue] : no fatigue [Chest Pain] : no chest pain [Palpitations] : no palpitations [Shortness Of Breath] : no shortness of breath [Cough] : no cough [Abdominal Pain] : no abdominal pain [Nausea] : no nausea [Constipation] : no constipation [Diarrhea] : diarrhea [Vomiting] : no vomiting [Heartburn] : no heartburn [Melena] : no melena [Dysuria] : no dysuria [Vaginal Discharge] : no vaginal discharge [Skin Rash] : no skin rash [Dizziness] : no dizziness [Fainting] : no fainting [Anxiety] : no anxiety [Depression] : no depression [Easy Bleeding] : no easy bleeding [Easy Bruising] : no easy bruising [Swollen Glands] : no swollen glands [FreeTextEntry2] : intentional weight loss, drinking water, watching diet, not exercising [FreeTextEntry4] : had URI, resolved [FreeTextEntry9] : some joint pain

## 2018-11-24 NOTE — ASSESSMENT
[FreeTextEntry1] : 72 yo female with h/o as above including predM, osteopenia, hypothyroidism, pulmonary nodules (stable on imaging over years), here for CPE.\par 1.  CV - bp at goal, check lipids\par 2.  GI - due for colonoscopy, awaiting repeat next month\par 3.  Endo - rx dexa given as due, check vitamin D, hgbA1c for preDM, TSH for hypothyroidism\par 4. Gyn - no further paps needed, mammo utd\par 5.  HCM - check below labs, had nl CBC and CMP last month in ER; flu shot today, consider Tdap and shingrix\par 6.  RTO prn or 1 year

## 2018-11-24 NOTE — ADDENDUM
[FreeTextEntry1] : 11/1/18:  Reviewed labs, nl except hgbA1c 5.9 c/w stable preDM, LDL 85 but with 10 year ASCVD risk score 10%, to consider statin - LM to call back to discuss.\par 11/4/18:  Discussed above with pt, wants to hold on statin, will consider in the future.\par 11/24/18:  DEXA shows stable osteopenia with low FRAX score - c/w diet and exercise, hold on meds - mailed letter to pt.

## 2018-11-24 NOTE — HEALTH RISK ASSESSMENT
[No falls in past year] : Patient reported no falls in the past year [0] : 2) Feeling down, depressed, or hopeless: Not at all (0) [Fully functional (bathing, dressing, toileting, transferring, walking, feeding)] : Fully functional (bathing, dressing, toileting, transferring, walking, feeding) [Fully functional (using the telephone, shopping, preparing meals, housekeeping, doing laundry, using] : Fully functional and needs no help or supervision to perform IADLs (using the telephone, shopping, preparing meals, housekeeping, doing laundry, using transportation, managing medications and managing finances) [Discussed at today's visit] : Advance Directives Discussed at today's visit [Designated Healthcare Proxy] : Designated healthcare proxy [Relationship: ___] : Relationship: [unfilled] [Reports changes in hearing] : Reports no changes in hearing [Reports changes in vision] : Reports no changes in vision [MammogramComments] : utd [PapSmearComments] : several years [BoneDensityComments] : 2015 [ColonoscopyComments] : has appt for colonoscopy for next month by Dr. Benitez [de-identified] : sees optho

## 2018-11-27 ENCOUNTER — RX RENEWAL (OUTPATIENT)
Age: 73
End: 2018-11-27

## 2019-01-03 ENCOUNTER — OUTPATIENT (OUTPATIENT)
Dept: OUTPATIENT SERVICES | Facility: HOSPITAL | Age: 74
LOS: 1 days | End: 2019-01-03
Payer: MEDICARE

## 2019-01-03 ENCOUNTER — RESULT REVIEW (OUTPATIENT)
Age: 74
End: 2019-01-03

## 2019-01-03 ENCOUNTER — APPOINTMENT (OUTPATIENT)
Dept: GASTROENTEROLOGY | Facility: HOSPITAL | Age: 74
End: 2019-01-03

## 2019-01-03 DIAGNOSIS — Z86.010 PERSONAL HISTORY OF COLONIC POLYPS: ICD-10-CM

## 2019-01-03 DIAGNOSIS — Z98.49 CATARACT EXTRACTION STATUS, UNSPECIFIED EYE: Chronic | ICD-10-CM

## 2019-01-03 DIAGNOSIS — Z80.0 FAMILY HISTORY OF MALIGNANT NEOPLASM OF DIGESTIVE ORGANS: ICD-10-CM

## 2019-01-03 PROCEDURE — 88305 TISSUE EXAM BY PATHOLOGIST: CPT

## 2019-01-03 PROCEDURE — 45380 COLONOSCOPY AND BIOPSY: CPT | Mod: GC

## 2019-01-03 PROCEDURE — 45380 COLONOSCOPY AND BIOPSY: CPT | Mod: PT

## 2019-01-03 PROCEDURE — 88305 TISSUE EXAM BY PATHOLOGIST: CPT | Mod: 26

## 2019-01-04 LAB — SURGICAL PATHOLOGY STUDY: SIGNIFICANT CHANGE UP

## 2019-02-14 ENCOUNTER — RESULT REVIEW (OUTPATIENT)
Age: 74
End: 2019-02-14

## 2019-05-30 ENCOUNTER — APPOINTMENT (OUTPATIENT)
Dept: INTERNAL MEDICINE | Facility: CLINIC | Age: 74
End: 2019-05-30
Payer: MEDICARE

## 2019-05-30 PROCEDURE — 81003 URINALYSIS AUTO W/O SCOPE: CPT | Mod: QW

## 2019-05-30 PROCEDURE — 99213 OFFICE O/P EST LOW 20 MIN: CPT | Mod: 25

## 2019-05-31 LAB
BILIRUB UR QL STRIP: NORMAL
CLARITY UR: CLEAR
COLLECTION METHOD: NORMAL
GLUCOSE UR-MCNC: NORMAL
HCG UR QL: 0.2 EU/DL
HGB UR QL STRIP.AUTO: NORMAL
KETONES UR-MCNC: NORMAL
LEUKOCYTE ESTERASE UR QL STRIP: NORMAL
NITRITE UR QL STRIP: NORMAL
PH UR STRIP: 5.5
PROT UR STRIP-MCNC: NORMAL
SP GR UR STRIP: 1.01

## 2019-06-01 NOTE — PHYSICAL EXAM
[Suprapubic] : in the suprapubic area [RUQ] : in the right upper quadrant [None] : no CVA tenderness

## 2019-06-01 NOTE — HISTORY OF PRESENT ILLNESS
[Initial Evaluation, This Episode] : an initial evaluation of this episode of [Dysuria] : dysuria [Urinary Frequency] : no urinary frequency [Urinary Urgency] : urinary urgency [Hematuria] : no hematuria [Urinary Incontinence] : urinary incontinence [Nocturia] : no nocturia [Abdominal Pain] : abdominal pain [Malodorous Urine] : no malodorous urine [Back Pain] : no back pain [Flank Pain] : no flank pain [Right Upper Abdomen] : right upper abdomen [___ Day(s) Ago] : [unfilled] day(s) ago [Sudden] : sudden [General Malaise] : no general malaise [Fever] : no fever [Chills] : no chills [Nausea] : no nausea

## 2019-06-03 LAB — BACTERIA UR CULT: ABNORMAL

## 2019-06-04 ENCOUNTER — RX RENEWAL (OUTPATIENT)
Age: 74
End: 2019-06-04

## 2019-06-06 ENCOUNTER — FORM ENCOUNTER (OUTPATIENT)
Age: 74
End: 2019-06-06

## 2019-06-07 ENCOUNTER — APPOINTMENT (OUTPATIENT)
Dept: ULTRASOUND IMAGING | Facility: CLINIC | Age: 74
End: 2019-06-07
Payer: MEDICARE

## 2019-06-07 ENCOUNTER — OUTPATIENT (OUTPATIENT)
Dept: OUTPATIENT SERVICES | Facility: HOSPITAL | Age: 74
LOS: 1 days | End: 2019-06-07
Payer: MEDICARE

## 2019-06-07 DIAGNOSIS — Z98.49 CATARACT EXTRACTION STATUS, UNSPECIFIED EYE: Chronic | ICD-10-CM

## 2019-06-07 DIAGNOSIS — Z00.8 ENCOUNTER FOR OTHER GENERAL EXAMINATION: ICD-10-CM

## 2019-06-07 PROCEDURE — 76700 US EXAM ABDOM COMPLETE: CPT

## 2019-06-07 PROCEDURE — 76700 US EXAM ABDOM COMPLETE: CPT | Mod: 26

## 2019-08-09 ENCOUNTER — APPOINTMENT (OUTPATIENT)
Dept: OPHTHALMOLOGY | Facility: CLINIC | Age: 74
End: 2019-08-09
Payer: MEDICARE

## 2019-08-09 ENCOUNTER — NON-APPOINTMENT (OUTPATIENT)
Age: 74
End: 2019-08-09

## 2019-08-09 PROCEDURE — 92133 CPTRZD OPH DX IMG PST SGM ON: CPT

## 2019-08-09 PROCEDURE — 92012 INTRM OPH EXAM EST PATIENT: CPT

## 2019-08-16 ENCOUNTER — APPOINTMENT (OUTPATIENT)
Dept: INTERNAL MEDICINE | Facility: CLINIC | Age: 74
End: 2019-08-16
Payer: MEDICARE

## 2019-08-16 VITALS
OXYGEN SATURATION: 98 % | WEIGHT: 147 LBS | BODY MASS INDEX: 34.02 KG/M2 | DIASTOLIC BLOOD PRESSURE: 80 MMHG | SYSTOLIC BLOOD PRESSURE: 120 MMHG | HEIGHT: 55 IN | HEART RATE: 81 BPM

## 2019-08-16 LAB
BILIRUB UR QL STRIP: NORMAL
CLARITY UR: CLEAR
COLLECTION METHOD: NORMAL
GLUCOSE UR-MCNC: NORMAL
HCG UR QL: 0.2 EU/DL
HGB UR QL STRIP.AUTO: NORMAL
KETONES UR-MCNC: NORMAL
LEUKOCYTE ESTERASE UR QL STRIP: NORMAL
NITRITE UR QL STRIP: NORMAL
PH UR STRIP: 6
PROT UR STRIP-MCNC: NORMAL
SP GR UR STRIP: 1.02

## 2019-08-16 PROCEDURE — 81003 URINALYSIS AUTO W/O SCOPE: CPT | Mod: QW

## 2019-08-16 PROCEDURE — 99213 OFFICE O/P EST LOW 20 MIN: CPT | Mod: 25

## 2019-08-16 RX ORDER — CIPROFLOXACIN HYDROCHLORIDE 500 MG/1
500 TABLET, FILM COATED ORAL TWICE DAILY
Qty: 20 | Refills: 0 | Status: DISCONTINUED | COMMUNITY
Start: 2019-05-30 | End: 2019-08-16

## 2019-08-22 LAB
CANDIDA VAG CYTO: NOT DETECTED
G VAGINALIS+PREV SP MTYP VAG QL MICRO: NOT DETECTED
T VAGINALIS VAG QL WET PREP: NOT DETECTED

## 2019-08-22 NOTE — PHYSICAL EXAM
[No Acute Distress] : no acute distress [Well Nourished] : well nourished [Well Developed] : well developed [Well-Appearing] : well-appearing [Supple] : supple [No Respiratory Distress] : no respiratory distress  [Soft] : abdomen soft [Non Tender] : non-tender [Non-distended] : non-distended [No Masses] : no abdominal mass palpated [No HSM] : no HSM [Normal Bowel Sounds] : normal bowel sounds [Vagina] : normal vaginal exam [de-identified] : no abnl visualized to vaginal vault, no obvious vaginal discharge or bleeding [FreeTextEntry1] : no cervix in place, no suprapubic masses on bimanual exam

## 2019-08-22 NOTE — HISTORY OF PRESENT ILLNESS
[FreeTextEntry8] : 74 yo female with h/o as below here for slight brown staining on pantiliner past few weeks, no blood in urine aware of.  Believes coming from vaginal area.  Has been going on for 3 weeks.  No vaginal discharge.  Not noticing it when wiping.  Doesn't have gyn that she has seen.  No vaginal discharge, no pruritis, but slightly foul-smelling.  Does not appear to be stool leakage.  Had TAHBSO previously for fibroids/ovarian cyst.\par Was seen few months ago for abdominal pain when sitting in the car and driving, US abdomen normal.  Still sometimes has sharp RUQ abdominal pain.  Also had UTI recently but urinary symptoms resolved.\par No other active issues.

## 2019-08-22 NOTE — ASSESSMENT
[FreeTextEntry1] : 74 yo female with h/o as above here for suspected vaginal staining over past few weeks of ?etiology.  Will send out vaginal swab for BV panel, but no obvious discharge seen, ?from vaginal mucosal source (ie if exposed blood vessel), s/p TAHBSO, if BV panel negative, advised to f/up upstairs with gyn for eval.  UA in office negative and no urinary symptoms.  RTO prn or few months for CPE.

## 2019-09-28 ENCOUNTER — EMERGENCY (EMERGENCY)
Facility: HOSPITAL | Age: 74
LOS: 1 days | Discharge: HOME | End: 2019-09-28
Attending: EMERGENCY MEDICINE
Payer: MEDICARE

## 2019-09-28 VITALS
WEIGHT: 147.05 LBS | TEMPERATURE: 99 F | OXYGEN SATURATION: 98 % | DIASTOLIC BLOOD PRESSURE: 90 MMHG | SYSTOLIC BLOOD PRESSURE: 149 MMHG | RESPIRATION RATE: 17 BRPM | HEART RATE: 87 BPM | HEIGHT: 59 IN

## 2019-09-28 VITALS
RESPIRATION RATE: 18 BRPM | HEART RATE: 75 BPM | SYSTOLIC BLOOD PRESSURE: 145 MMHG | OXYGEN SATURATION: 100 % | DIASTOLIC BLOOD PRESSURE: 89 MMHG | TEMPERATURE: 98 F

## 2019-09-28 DIAGNOSIS — Z98.49 CATARACT EXTRACTION STATUS, UNSPECIFIED EYE: Chronic | ICD-10-CM

## 2019-09-28 LAB
APPEARANCE UR: ABNORMAL
BACTERIA # UR AUTO: ABNORMAL
BILIRUB UR-MCNC: NEGATIVE — SIGNIFICANT CHANGE UP
COLOR SPEC: SIGNIFICANT CHANGE UP
DIFF PNL FLD: ABNORMAL
EPI CELLS # UR: 1 — SIGNIFICANT CHANGE UP
GLUCOSE UR QL: NEGATIVE — SIGNIFICANT CHANGE UP
HYALINE CASTS # UR AUTO: 0 /LPF — SIGNIFICANT CHANGE UP (ref 0–7)
KETONES UR-MCNC: NEGATIVE — SIGNIFICANT CHANGE UP
LEUKOCYTE ESTERASE UR-ACNC: ABNORMAL
NITRITE UR-MCNC: NEGATIVE — SIGNIFICANT CHANGE UP
PH UR: 6.5 — SIGNIFICANT CHANGE UP (ref 5–8)
PROT UR-MCNC: NEGATIVE — SIGNIFICANT CHANGE UP
RBC CASTS # UR COMP ASSIST: SIGNIFICANT CHANGE UP /HPF (ref 0–4)
SP GR SPEC: 1.02 — SIGNIFICANT CHANGE UP (ref 1.01–1.02)
UROBILINOGEN FLD QL: NEGATIVE — SIGNIFICANT CHANGE UP
WBC UR QL: >50 /HPF — HIGH (ref 0–5)

## 2019-09-28 PROCEDURE — 87086 URINE CULTURE/COLONY COUNT: CPT

## 2019-09-28 PROCEDURE — 87186 SC STD MICRODIL/AGAR DIL: CPT

## 2019-09-28 PROCEDURE — 99283 EMERGENCY DEPT VISIT LOW MDM: CPT

## 2019-09-28 PROCEDURE — 99284 EMERGENCY DEPT VISIT MOD MDM: CPT

## 2019-09-28 PROCEDURE — 81001 URINALYSIS AUTO W/SCOPE: CPT

## 2019-09-28 RX ORDER — ACETAMINOPHEN 500 MG
650 TABLET ORAL ONCE
Refills: 0 | Status: COMPLETED | OUTPATIENT
Start: 2019-09-28 | End: 2019-09-28

## 2019-09-28 RX ORDER — CEPHALEXIN 500 MG
1 CAPSULE ORAL
Qty: 15 | Refills: 0
Start: 2019-09-28 | End: 2019-10-02

## 2019-09-28 RX ORDER — CEPHALEXIN 500 MG
500 CAPSULE ORAL ONCE
Refills: 0 | Status: COMPLETED | OUTPATIENT
Start: 2019-09-28 | End: 2019-09-28

## 2019-09-28 RX ADMIN — Medication 500 MILLIGRAM(S): at 20:15

## 2019-09-28 RX ADMIN — Medication 650 MILLIGRAM(S): at 20:16

## 2019-09-28 NOTE — ED PROVIDER NOTE - OBJECTIVE STATEMENT
74F hx of prior UTI x1 episode of pyelonephritis presents with a cc of dysuria, increased urinary frequency over past x2 days c/w prior episodes of UTI, no other complaints. Denies n/v/f/c/cp/sob. Denies headache, syncope, lightheadedness, dizziness. Denies chest palpitations, abdominal pain. Denies hematuria, hematochezia, BRBPR, tarry stools, diarrhea.

## 2019-09-28 NOTE — ED ADULT NURSE NOTE - CHPI ED NUR SYMPTOMS NEG
no diarrhea/no hematuria/no nausea/no abdominal distension/no blood in stool/no chills/no fever/no dysuria

## 2019-09-28 NOTE — ED PROVIDER NOTE - PATIENT PORTAL LINK FT
You can access the FollowMyHealth Patient Portal offered by [facility] by registering at the following website: [email or web address]. By joining [employer]’s FollowMyHealth portal, you will also be able to view your health information using other applications (apps) compatible with our system.

## 2019-09-28 NOTE — ED ADULT NURSE NOTE - OBJECTIVE STATEMENT
Pt is a 77 yo F who came to the ED amb c/o pain, burning and urinary frequency x 3-4 days. Denies fever/chills, no back pain, no abd pain/n/v/d, no hematuria. States she had similar symptoms 1 year ago and she had pyelonephritis. A/O x3.

## 2019-09-28 NOTE — ED PROVIDER NOTE - NSFOLLOWUPINSTRUCTIONS_ED_ALL_ED_FT
Follow up with your primary care doctor within the next 3-5 days for re-evaluation and continued care.   Please take TYLENOL 650mg every 4 hours, and IBUPROFEN 400mg every 6 hours, as needed for pain.   Please take KEFLEX as prescribed.

## 2019-09-28 NOTE — ED PROVIDER NOTE - ATTENDING CONTRIBUTION TO CARE
75 yo female with urinary frequency/burning; treated for UTI as outpatient ~6 months ago.  Well appearing, no flank pain, afebrile -- not pyelo.  check labs, UA, treat if necessary, outpatient f/u.

## 2019-09-30 NOTE — ED POST DISCHARGE NOTE - RESULT SUMMARY
UCx prelim + 100k GN rods; pt d/c with keflex rx; will f/u final results/sensitivities. - Lalita Woodward PA-C

## 2019-10-07 ENCOUNTER — APPOINTMENT (OUTPATIENT)
Dept: INTERNAL MEDICINE | Facility: CLINIC | Age: 74
End: 2019-10-07
Payer: MEDICARE

## 2019-10-07 VITALS
HEIGHT: 55 IN | OXYGEN SATURATION: 97 % | WEIGHT: 147 LBS | BODY MASS INDEX: 34.02 KG/M2 | SYSTOLIC BLOOD PRESSURE: 120 MMHG | DIASTOLIC BLOOD PRESSURE: 80 MMHG | HEART RATE: 93 BPM

## 2019-10-07 PROCEDURE — 99213 OFFICE O/P EST LOW 20 MIN: CPT

## 2019-10-09 NOTE — ASSESSMENT
[FreeTextEntry1] : 75 yo female with h/o as above including hypothyroidism and preDM here for f/up from ER for UTI, urinary symptoms resolved with antibiotics.  Still has atypical RUQ pain when sitting in car for long period of time, ?gas pain or muscle cramp, suggested trial of back brace when driving, if continues to happen will consider further eval with CT abdomen.  RTO few months CPE.\par

## 2019-10-09 NOTE — PHYSICAL EXAM
[Well Nourished] : well nourished [No Acute Distress] : no acute distress [Well Developed] : well developed [Supple] : supple [Well-Appearing] : well-appearing [No Respiratory Distress] : no respiratory distress  [Soft] : abdomen soft [Non Tender] : non-tender [Non-distended] : non-distended [No Masses] : no abdominal mass palpated [Normal Bowel Sounds] : normal bowel sounds [No HSM] : no HSM [No CVA Tenderness] : no CVA  tenderness

## 2019-10-09 NOTE — HISTORY OF PRESENT ILLNESS
[FreeTextEntry2] : 75 yo female with h/o as below including hypothyroidism and preDM here for f/up visit from ER.\par Went to ER for vaginal symptoms, found to have UTI, given cephalexin 500 mg q8 x 5 days and urine symptoms resolved, UCx showed Ecoli sensitive to cephalosporins.  Otherwise feeling well.\par Continnues to have pain RUQ when driving a lot (long distances), otherwise doesn't feel the pain, pain feels sharp like something stabbing her in that area, but resolves when stands up.  Had nl abdominal sono. \par No other active issues.

## 2019-11-24 ENCOUNTER — TRANSCRIPTION ENCOUNTER (OUTPATIENT)
Age: 74
End: 2019-11-24

## 2019-12-05 ENCOUNTER — RX RENEWAL (OUTPATIENT)
Age: 74
End: 2019-12-05

## 2019-12-13 ENCOUNTER — APPOINTMENT (OUTPATIENT)
Dept: INTERNAL MEDICINE | Facility: CLINIC | Age: 74
End: 2019-12-13
Payer: MEDICARE

## 2019-12-13 VITALS
SYSTOLIC BLOOD PRESSURE: 130 MMHG | HEIGHT: 55 IN | WEIGHT: 144 LBS | DIASTOLIC BLOOD PRESSURE: 70 MMHG | OXYGEN SATURATION: 98 % | BODY MASS INDEX: 33.33 KG/M2 | HEART RATE: 91 BPM | TEMPERATURE: 98.1 F

## 2019-12-13 DIAGNOSIS — R09.82 POSTNASAL DRIP: ICD-10-CM

## 2019-12-13 PROCEDURE — 99213 OFFICE O/P EST LOW 20 MIN: CPT

## 2019-12-13 RX ORDER — IPRATROPIUM BROMIDE 42 UG/1
0.06 SPRAY NASAL 3 TIMES DAILY
Qty: 1 | Refills: 0 | Status: COMPLETED | COMMUNITY
Start: 2019-12-13 | End: 2019-12-28

## 2019-12-13 NOTE — PHYSICAL EXAM
[No Acute Distress] : no acute distress [Well Developed] : well developed [Well Nourished] : well nourished [Normal Sclera/Conjunctiva] : normal sclera/conjunctiva [Well-Appearing] : well-appearing [PERRL] : pupils equal round and reactive to light [EOMI] : extraocular movements intact [Normal Outer Ear/Nose] : the outer ears and nose were normal in appearance [Normal TMs] : both tympanic membranes were normal [No JVD] : no jugular venous distention [Normal Oropharynx] : the oropharynx was normal [Thyroid Normal, No Nodules] : the thyroid was normal and there were no nodules present [Supple] : supple [No Lymphadenopathy] : no lymphadenopathy [Clear to Auscultation] : lungs were clear to auscultation bilaterally [No Respiratory Distress] : no respiratory distress  [No Accessory Muscle Use] : no accessory muscle use [Normal Percussion] : the chest was normal to percussion [Regular Rhythm] : with a regular rhythm [Normal Rate] : normal rate  [No Carotid Bruits] : no carotid bruits [Normal S1, S2] : normal S1 and S2 [No Murmur] : no murmur heard [No Abdominal Bruit] : a ~M bruit was not heard ~T in the abdomen [Pedal Pulses Present] : the pedal pulses are present [No Varicosities] : no varicosities [No Edema] : there was no peripheral edema [No Extremity Clubbing/Cyanosis] : no extremity clubbing/cyanosis [No Palpable Aorta] : no palpable aorta [Non-distended] : non-distended [Soft] : abdomen soft [Non Tender] : non-tender [Normal Bowel Sounds] : normal bowel sounds [No HSM] : no HSM [No Masses] : no abdominal mass palpated [Normal Posterior Cervical Nodes] : no posterior cervical lymphadenopathy [Normal Anterior Cervical Nodes] : no anterior cervical lymphadenopathy [No Spinal Tenderness] : no spinal tenderness [No CVA Tenderness] : no CVA  tenderness [No Rash] : no rash [No Joint Swelling] : no joint swelling [Grossly Normal Strength/Tone] : grossly normal strength/tone [Deep Tendon Reflexes (DTR)] : deep tendon reflexes were 2+ and symmetric [No Focal Deficits] : no focal deficits [Coordination Grossly Intact] : coordination grossly intact [Normal Gait] : normal gait [de-identified] : cobblestone mucosa [Normal Insight/Judgement] : insight and judgment were intact [Normal Affect] : the affect was normal

## 2019-12-13 NOTE — HISTORY OF PRESENT ILLNESS
[FreeTextEntry8] : 74  hypothyroidism  IFG  diverticulosis\par \par Nov 10 dry cough\par Nov 24 Urgent Care  augmentin  Benzonate  bromphen-pseudophedrine flonase\par Nov 27 noted no augmentin  Urgent care gave augmentin  TREATED SINUSITIS\par \par No fever no SOB no TORRES\par All PND now NO WHEEZE NO CHEST CONGESTION

## 2019-12-13 NOTE — REVIEW OF SYSTEMS
[Cough] : cough [Chest Pain] : no chest pain [Fever] : no fever [Shortness Of Breath] : no shortness of breath [Wheezing] : no wheezing

## 2020-01-09 ENCOUNTER — APPOINTMENT (OUTPATIENT)
Dept: OTOLARYNGOLOGY | Facility: CLINIC | Age: 75
End: 2020-01-09

## 2020-01-13 NOTE — REVIEW OF SYSTEMS
[Ear Itch] : ear itch [Ear Pain] : ear pain [Sinus Pain] : sinus pain [Sinus Pressure] : sinus pressure [Throat Itching] : throat itching [Discolored Nasal Discharge] : discolored nasal discharge [Throat Dryness] : throat dryness [Heartburn] : heartburn [Negative] : Heme/Lymph [FreeTextEntry7] : Reflux [de-identified] : Headache [de-identified] : Feels cooler than others

## 2020-01-29 ENCOUNTER — APPOINTMENT (OUTPATIENT)
Dept: INTERNAL MEDICINE | Facility: CLINIC | Age: 75
End: 2020-01-29
Payer: MEDICARE

## 2020-01-29 VITALS
OXYGEN SATURATION: 97 % | SYSTOLIC BLOOD PRESSURE: 128 MMHG | HEIGHT: 55 IN | WEIGHT: 143 LBS | BODY MASS INDEX: 33.09 KG/M2 | HEART RATE: 83 BPM | DIASTOLIC BLOOD PRESSURE: 80 MMHG

## 2020-01-29 DIAGNOSIS — Z87.42 PERSONAL HISTORY OF OTHER DISEASES OF THE FEMALE GENITAL TRACT: ICD-10-CM

## 2020-01-29 DIAGNOSIS — Z80.0 FAMILY HISTORY OF MALIGNANT NEOPLASM OF DIGESTIVE ORGANS: ICD-10-CM

## 2020-01-29 DIAGNOSIS — Z92.29 PERSONAL HISTORY OF OTHER DRUG THERAPY: ICD-10-CM

## 2020-01-29 DIAGNOSIS — R91.8 OTHER NONSPECIFIC ABNORMAL FINDING OF LUNG FIELD: ICD-10-CM

## 2020-01-29 DIAGNOSIS — N39.0 URINARY TRACT INFECTION, SITE NOT SPECIFIED: ICD-10-CM

## 2020-01-29 DIAGNOSIS — H04.129 DRY EYE SYNDROME OF UNSPECIFIED LACRIMAL GLAND: ICD-10-CM

## 2020-01-29 DIAGNOSIS — S73.102A UNSPECIFIED SPRAIN OF LEFT HIP, INITIAL ENCOUNTER: ICD-10-CM

## 2020-01-29 DIAGNOSIS — R10.31 RIGHT LOWER QUADRANT PAIN: ICD-10-CM

## 2020-01-29 PROCEDURE — G0439: CPT

## 2020-01-29 PROCEDURE — G0444 DEPRESSION SCREEN ANNUAL: CPT

## 2020-01-29 RX ORDER — SACCHAROMYCES BOULARDII 50 MG
250 CAPSULE ORAL
Qty: 60 | Refills: 3 | Status: DISCONTINUED | COMMUNITY
Start: 2019-12-13 | End: 2020-01-29

## 2020-02-13 NOTE — ED ADULT TRIAGE NOTE - BSA (M2)
1.64 CONSTITUTIONAL: Negatve   SKIN: Negatve   HEAD: Negatve   EYES: Negatve   ENT: Negatve   NECK: Negatve   CARD:Negatve   RESP:Negatve   ABD: hpi  EXT: Negatve  LYMPH: Negatve   NEURO: Negatve   PSYCH: Negatve

## 2020-02-25 LAB
25(OH)D3 SERPL-MCNC: 37.5 NG/ML
ALBUMIN SERPL ELPH-MCNC: 4.7 G/DL
ALP BLD-CCNC: 86 U/L
ALT SERPL-CCNC: 33 U/L
ANION GAP SERPL CALC-SCNC: 13 MMOL/L
AST SERPL-CCNC: 27 U/L
BASOPHILS # BLD AUTO: 0.01 K/UL
BASOPHILS NFR BLD AUTO: 0.2 %
BILIRUB SERPL-MCNC: 0.4 MG/DL
BUN SERPL-MCNC: 20 MG/DL
CALCIUM SERPL-MCNC: 10.3 MG/DL
CHLORIDE SERPL-SCNC: 104 MMOL/L
CHOLEST SERPL-MCNC: 158 MG/DL
CHOLEST/HDLC SERPL: 4 RATIO
CO2 SERPL-SCNC: 23 MMOL/L
CREAT SERPL-MCNC: 0.8 MG/DL
ENA SS-A AB SER IA-ACNC: >8 AL
ENA SS-B AB SER IA-ACNC: 2.6 AL
EOSINOPHIL # BLD AUTO: 0.06 K/UL
EOSINOPHIL NFR BLD AUTO: 1.3 %
ESTIMATED AVERAGE GLUCOSE: 126 MG/DL
GLUCOSE SERPL-MCNC: 90 MG/DL
HBA1C MFR BLD HPLC: 6 %
HCT VFR BLD CALC: 43.8 %
HDLC SERPL-MCNC: 39 MG/DL
HGB BLD-MCNC: 14.2 G/DL
IMM GRANULOCYTES NFR BLD AUTO: 0.2 %
LDLC SERPL CALC-MCNC: 91 MG/DL
LYMPHOCYTES # BLD AUTO: 1.96 K/UL
LYMPHOCYTES NFR BLD AUTO: 41.8 %
MAN DIFF?: NORMAL
MCHC RBC-ENTMCNC: 29.4 PG
MCHC RBC-ENTMCNC: 32.4 GM/DL
MCV RBC AUTO: 90.7 FL
MONOCYTES # BLD AUTO: 0.28 K/UL
MONOCYTES NFR BLD AUTO: 6 %
NEUTROPHILS # BLD AUTO: 2.37 K/UL
NEUTROPHILS NFR BLD AUTO: 50.5 %
PLATELET # BLD AUTO: 307 K/UL
POTASSIUM SERPL-SCNC: 5 MMOL/L
PROT SERPL-MCNC: 7.8 G/DL
RBC # BLD: 4.83 M/UL
RBC # FLD: 14.2 %
SODIUM SERPL-SCNC: 140 MMOL/L
TRIGL SERPL-MCNC: 134 MG/DL
TSH SERPL-ACNC: 0.95 UIU/ML
WBC # FLD AUTO: 4.69 K/UL

## 2020-02-26 ENCOUNTER — APPOINTMENT (OUTPATIENT)
Dept: OPHTHALMOLOGY | Facility: CLINIC | Age: 75
End: 2020-02-26
Payer: MEDICARE

## 2020-02-26 ENCOUNTER — NON-APPOINTMENT (OUTPATIENT)
Age: 75
End: 2020-02-26

## 2020-02-26 PROCEDURE — 92012 INTRM OPH EXAM EST PATIENT: CPT

## 2020-03-04 ENCOUNTER — FORM ENCOUNTER (OUTPATIENT)
Age: 75
End: 2020-03-04

## 2020-03-05 ENCOUNTER — OUTPATIENT (OUTPATIENT)
Dept: OUTPATIENT SERVICES | Facility: HOSPITAL | Age: 75
LOS: 1 days | End: 2020-03-05
Payer: MEDICARE

## 2020-03-05 ENCOUNTER — APPOINTMENT (OUTPATIENT)
Dept: ULTRASOUND IMAGING | Facility: IMAGING CENTER | Age: 75
End: 2020-03-05
Payer: MEDICARE

## 2020-03-05 ENCOUNTER — APPOINTMENT (OUTPATIENT)
Dept: MAMMOGRAPHY | Facility: IMAGING CENTER | Age: 75
End: 2020-03-05
Payer: MEDICARE

## 2020-03-05 DIAGNOSIS — Z00.00 ENCOUNTER FOR GENERAL ADULT MEDICAL EXAMINATION WITHOUT ABNORMAL FINDINGS: ICD-10-CM

## 2020-03-05 DIAGNOSIS — E03.9 HYPOTHYROIDISM, UNSPECIFIED: ICD-10-CM

## 2020-03-05 DIAGNOSIS — Z98.49 CATARACT EXTRACTION STATUS, UNSPECIFIED EYE: Chronic | ICD-10-CM

## 2020-03-05 PROCEDURE — 77063 BREAST TOMOSYNTHESIS BI: CPT | Mod: 26

## 2020-03-05 PROCEDURE — 76536 US EXAM OF HEAD AND NECK: CPT

## 2020-03-05 PROCEDURE — 76536 US EXAM OF HEAD AND NECK: CPT | Mod: 26

## 2020-03-05 PROCEDURE — 77067 SCR MAMMO BI INCL CAD: CPT | Mod: 26

## 2020-03-05 PROCEDURE — 77063 BREAST TOMOSYNTHESIS BI: CPT

## 2020-03-05 PROCEDURE — 77067 SCR MAMMO BI INCL CAD: CPT

## 2020-03-09 ENCOUNTER — TRANSCRIPTION ENCOUNTER (OUTPATIENT)
Age: 75
End: 2020-03-09

## 2020-03-16 ENCOUNTER — APPOINTMENT (OUTPATIENT)
Dept: OTOLARYNGOLOGY | Facility: CLINIC | Age: 75
End: 2020-03-16

## 2020-04-02 NOTE — HISTORY OF PRESENT ILLNESS
[FreeTextEntry1] : physical [de-identified] : 75 yo female with h/o as below here for CPE.\par Was very sick with the flu in November, went to urgent care and given medication but forgot to get antibiotic (was delayed in getting it), still didn't feel well so went to Dr. Ferro, given nasal spray, advised to see ENT eval, but waited too long for appointment so left.  Has nasal polyps, affects her in the wintertime.  Now feeling better.\par Sometimes when sitting down will have pain RUQ.  Will occur rarely.\par Otherwise feeling well.  Will see optho end of this month for checkup, said had sjogren's previously.\par No other active issues.

## 2020-04-02 NOTE — ASSESSMENT
[FreeTextEntry1] : 75 yo female with h/o as above including hypothyroidism, osteopenia, preDM, carpal tunnel syndrome, dry eyes, stable pulmonary nodules on many years of imaging, here for CPE.\par 1.  CV - bp at goal, check lipids\par 2.  Endo - prominent thyroid on exam, ?nodule, check thyroid sono; check TSH for hypothyroidism, hgbA1c for preDM, vitamin D for osteopenia, dexa utd\par 3.  Rheum - has dry eyes, told has sjogrens but not sure if formally diagnosed, will start with ab, if found to have sjogren's would monitor for gammopathy in future\par 4.  GI - colonoscopy utd, due few years\par 5.  Gyn - no further paps needed, rx mammo given as due\par 6.  HCM - check other below labs; to check if tdap and shingrix covered by insurance, other vaccines utd\par 7.  RTO prn or 1 year\par \par \par

## 2020-04-02 NOTE — PHYSICAL EXAM
[No Acute Distress] : no acute distress [Well-Appearing] : well-appearing [Well Nourished] : well nourished [Well Developed] : well developed [PERRL] : pupils equal round and reactive to light [Normal Oropharynx] : the oropharynx was normal [No Lymphadenopathy] : no lymphadenopathy [Normal TMs] : both tympanic membranes were normal [No Respiratory Distress] : no respiratory distress  [Supple] : supple [Clear to Auscultation] : lungs were clear to auscultation bilaterally [No Accessory Muscle Use] : no accessory muscle use [Regular Rhythm] : with a regular rhythm [Normal Rate] : normal rate  [Normal S1, S2] : normal S1 and S2 [No Murmur] : no murmur heard [No Carotid Bruits] : no carotid bruits [No Edema] : there was no peripheral edema [Pedal Pulses Present] : the pedal pulses are present [Normal Appearance] : normal in appearance [No Nipple Discharge] : no nipple discharge [No Axillary Lymphadenopathy] : no axillary lymphadenopathy [Soft] : abdomen soft [Non Tender] : non-tender [Non-distended] : non-distended [Normal Bowel Sounds] : normal bowel sounds [No HSM] : no HSM [No Masses] : no abdominal mass palpated [Normal Supraclavicular Nodes] : no supraclavicular lymphadenopathy [Normal Axillary Nodes] : no axillary lymphadenopathy [Normal Posterior Cervical Nodes] : no posterior cervical lymphadenopathy [Normal Anterior Cervical Nodes] : no anterior cervical lymphadenopathy [Normal Inguinal Nodes] : no inguinal lymphadenopathy [Normal Gait] : normal gait [No Joint Swelling] : no joint swelling [No Rash] : no rash [Normal Affect] : the affect was normal [de-identified] : cerumen right auditory canal blocking visualization of right TM [de-identified] : ?nodularity left side of thyroid, no discrete thyromegaly [de-identified] : no RUQ tenderness to palpation, generalized fullness lower abdomen but no masses and reports unchanged for her

## 2020-04-02 NOTE — HEALTH RISK ASSESSMENT
[No falls in past year] : Patient reported no falls in the past year [Fully functional (using the telephone, shopping, preparing meals, housekeeping, doing laundry, using] : Fully functional and needs no help or supervision to perform IADLs (using the telephone, shopping, preparing meals, housekeeping, doing laundry, using transportation, managing medications and managing finances) [Fully functional (bathing, dressing, toileting, transferring, walking, feeding)] : Fully functional (bathing, dressing, toileting, transferring, walking, feeding) [Designated Healthcare Proxy] : Designated healthcare proxy [Relationship: ___] : Relationship: [unfilled] [0] : 1) Little interest or pleasure doing things: Not at all (0) [MammogramComments] : due now [PapSmearComments] : no longer [BoneDensityComments] : utd [ColonoscopyComments] : utd, due 1/2022 (3 year f/up) [AdvancecareDate] : 01/20

## 2020-04-02 NOTE — REVIEW OF SYSTEMS
[Negative] : Musculoskeletal [Fever] : no fever [Chills] : no chills [Fatigue] : no fatigue [Recent Change In Weight] : ~T no recent weight change [Chest Pain] : no chest pain [Palpitations] : no palpitations [Shortness Of Breath] : no shortness of breath [Cough] : no cough [Dyspnea on Exertion] : no dyspnea on exertion [Abdominal Pain] : no abdominal pain [Nausea] : no nausea [Constipation] : no constipation [Diarrhea] : diarrhea [Vomiting] : no vomiting [Heartburn] : no heartburn [Melena] : no melena [Dysuria] : no dysuria [Vaginal Discharge] : no vaginal discharge [Skin Rash] : no skin rash [Dizziness] : no dizziness [Fainting] : no fainting [Anxiety] : no anxiety [Depression] : no depression [Easy Bleeding] : no easy bleeding [Easy Bruising] : no easy bruising [Swollen Glands] : no swollen glands [FreeTextEntry4] : see hpi [FreeTextEntry7] : occasional GERD

## 2020-04-02 NOTE — ADDENDUM
[FreeTextEntry1] : 2/13/20:  LM to call back re results.\par 2/25/20:  Reviewed labs with pt, nl except hgbA1c 6.0 c/w stable preDM (c/w diet/exercise), LDL 91 acceptable, +sjogren's ab, offered to have pt come back in for further labs (to check for gammopathy, etc) but pt prefers to see specialist so will refer to rheum for consultation.\par 4/2/20:  Reviewed imaging, mammo birads 1, thyroid US no nodules visualized - letter mailed to pt.

## 2020-04-11 ENCOUNTER — TRANSCRIPTION ENCOUNTER (OUTPATIENT)
Age: 75
End: 2020-04-11

## 2020-05-28 ENCOUNTER — NON-APPOINTMENT (OUTPATIENT)
Age: 75
End: 2020-05-28

## 2020-05-28 ENCOUNTER — APPOINTMENT (OUTPATIENT)
Dept: OPHTHALMOLOGY | Facility: CLINIC | Age: 75
End: 2020-05-28
Payer: MEDICARE

## 2020-05-28 PROCEDURE — 92012 INTRM OPH EXAM EST PATIENT: CPT

## 2020-11-02 ENCOUNTER — APPOINTMENT (OUTPATIENT)
Dept: INTERNAL MEDICINE | Facility: CLINIC | Age: 75
End: 2020-11-02
Payer: MEDICARE

## 2020-11-02 VITALS — SYSTOLIC BLOOD PRESSURE: 150 MMHG | DIASTOLIC BLOOD PRESSURE: 90 MMHG

## 2020-11-02 VITALS
HEART RATE: 86 BPM | HEIGHT: 57.5 IN | OXYGEN SATURATION: 96 % | BODY MASS INDEX: 31.92 KG/M2 | SYSTOLIC BLOOD PRESSURE: 150 MMHG | DIASTOLIC BLOOD PRESSURE: 80 MMHG | WEIGHT: 150 LBS

## 2020-11-02 PROCEDURE — 99072 ADDL SUPL MATRL&STAF TM PHE: CPT

## 2020-11-02 PROCEDURE — 99214 OFFICE O/P EST MOD 30 MIN: CPT

## 2020-11-04 NOTE — PHYSICAL EXAM
[No Acute Distress] : no acute distress [Well Nourished] : well nourished [Well Developed] : well developed [Well-Appearing] : well-appearing [Supple] : supple [No Respiratory Distress] : no respiratory distress  [No Accessory Muscle Use] : no accessory muscle use [Clear to Auscultation] : lungs were clear to auscultation bilaterally [Normal Rate] : normal rate  [Regular Rhythm] : with a regular rhythm [Normal S1, S2] : normal S1 and S2 [No Murmur] : no murmur heard [No Edema] : there was no peripheral edema [Soft] : abdomen soft [Non Tender] : non-tender [Non-distended] : non-distended [No Masses] : no abdominal mass palpated [No HSM] : no HSM [Normal Bowel Sounds] : normal bowel sounds

## 2020-11-10 ENCOUNTER — APPOINTMENT (OUTPATIENT)
Dept: CT IMAGING | Facility: CLINIC | Age: 75
End: 2020-11-10
Payer: MEDICARE

## 2020-11-10 ENCOUNTER — OUTPATIENT (OUTPATIENT)
Dept: OUTPATIENT SERVICES | Facility: HOSPITAL | Age: 75
LOS: 1 days | End: 2020-11-10
Payer: MEDICARE

## 2020-11-10 DIAGNOSIS — R10.11 RIGHT UPPER QUADRANT PAIN: ICD-10-CM

## 2020-11-10 DIAGNOSIS — Z98.49 CATARACT EXTRACTION STATUS, UNSPECIFIED EYE: Chronic | ICD-10-CM

## 2020-11-10 PROCEDURE — 74160 CT ABDOMEN W/CONTRAST: CPT | Mod: 26

## 2020-11-10 PROCEDURE — 82565 ASSAY OF CREATININE: CPT

## 2020-11-10 PROCEDURE — 74160 CT ABDOMEN W/CONTRAST: CPT

## 2020-11-17 NOTE — HISTORY OF PRESENT ILLNESS
[FreeTextEntry8] : 74 yo female with h/o as below here for recurrent abdominal pain, also one episode of dizziness.\par Had episode of dizziness few days ago, took dramamine and felt better.\par Noting some RLQ abdominal pain, if moving wrong way and especially when driving will have extreme pain.  Sometimes has pain when walking, sometimes when driving, will be so painful will scream.  Feels pain is getting worse over time, more frequent, still brief episodes of pain.  Hasn't seen gyn.  Has nl bowel movements but does go frequently, no diarrhea, no blood in stool except ?from hemorrhoids.  No melena, no nausea/vomiting, no vaginal discharge, no vaginal bleeding, no dysuria.  \par When has pain, feels like something sharp pushing her inside, will hurt when stands up.  \par Abdominal pain not associated with po intake, sometimes has GERD but not associated to the pain.  Doesn't have pain all the time.\par No other active issues.

## 2020-11-17 NOTE — ADDENDUM
[FreeTextEntry1] : 11/17/20:  Reviewed CT abdomen with pt, nl except some pulmonary nodules, stable.  Advised if continued pain to consider seeing GI if needed, but possible muscular pain or gas pain when driving.

## 2020-11-17 NOTE — ASSESSMENT
[FreeTextEntry1] : 74 yo female with h/o as above including hypothyroidism and preDM here for recurrent worsening/more frequent episodes of intermittent RUQ/right mid-abdominal pain over time.  Given frequency, will eval with imaging, will get CT abdomen for eval.  Elevated bp today but previously controlled, will monitor at next visit.  RTO few months cpe when due.

## 2020-12-22 ENCOUNTER — NON-APPOINTMENT (OUTPATIENT)
Age: 75
End: 2020-12-22

## 2020-12-23 ENCOUNTER — APPOINTMENT (OUTPATIENT)
Dept: INTERNAL MEDICINE | Facility: CLINIC | Age: 75
End: 2020-12-23
Payer: MEDICARE

## 2020-12-23 VITALS
OXYGEN SATURATION: 98 % | WEIGHT: 150 LBS | HEART RATE: 89 BPM | DIASTOLIC BLOOD PRESSURE: 80 MMHG | BODY MASS INDEX: 31.92 KG/M2 | SYSTOLIC BLOOD PRESSURE: 132 MMHG | HEIGHT: 57.5 IN

## 2020-12-23 DIAGNOSIS — R12 HEARTBURN: ICD-10-CM

## 2020-12-23 PROCEDURE — 99213 OFFICE O/P EST LOW 20 MIN: CPT

## 2020-12-23 PROCEDURE — 99072 ADDL SUPL MATRL&STAF TM PHE: CPT

## 2020-12-30 PROBLEM — R12 HEARTBURN: Status: ACTIVE | Noted: 2020-12-30

## 2020-12-30 NOTE — PHYSICAL EXAM
[No Acute Distress] : no acute distress [Well Nourished] : well nourished [Well Developed] : well developed [Well-Appearing] : well-appearing [Supple] : supple [No Respiratory Distress] : no respiratory distress  [No Accessory Muscle Use] : no accessory muscle use [No Edema] : there was no peripheral edema [Soft] : abdomen soft [Non Tender] : non-tender [Non-distended] : non-distended [No Masses] : no abdominal mass palpated [No HSM] : no HSM [Normal Bowel Sounds] : normal bowel sounds [No Rash] : no rash [Normal Gait] : normal gait [Normal Affect] : the affect was normal

## 2020-12-30 NOTE — HISTORY OF PRESENT ILLNESS
[FreeTextEntry1] : heartburn [de-identified] : 76 yo female with h/o as below including hypothyroidism and preDM here for f/up of abdominal symptoms from last visit.\par Will have heartburn, takes gas-x with relief.  Some foods, coffee/tea, spicy foods will aggravate it, if drinks things other than water will be affected by it.  Blueberries gave her heartburn as well.\par Still having RUQ abdominal discomfort when stands up after driving for long period of time.  Will see GI.\par Also notes occasional rectal bleeding when wiping, will address with GI as well.\par No other active issues, bp was good today.

## 2020-12-30 NOTE — ASSESSMENT
[FreeTextEntry1] : 76 yo female with h/o as above including hypothyroidism and preDM here for f/up of abdominal symptoms from last visit.  Persistent RUQ abdominal discomfort in particular after driving long distances, with nl CT A/P, advised to see GI about EGD (and possible colonoscopy for rectal bleeding).  Trial pepcid for heartburn.  RTO prn.\par \par

## 2021-01-12 ENCOUNTER — RESULT CHARGE (OUTPATIENT)
Age: 76
End: 2021-01-12

## 2021-01-12 ENCOUNTER — APPOINTMENT (OUTPATIENT)
Dept: INTERNAL MEDICINE | Facility: CLINIC | Age: 76
End: 2021-01-12
Payer: MEDICARE

## 2021-01-12 VITALS
HEART RATE: 85 BPM | DIASTOLIC BLOOD PRESSURE: 70 MMHG | SYSTOLIC BLOOD PRESSURE: 150 MMHG | HEIGHT: 57.5 IN | WEIGHT: 150 LBS | BODY MASS INDEX: 31.92 KG/M2 | OXYGEN SATURATION: 98 %

## 2021-01-12 VITALS — DIASTOLIC BLOOD PRESSURE: 70 MMHG | SYSTOLIC BLOOD PRESSURE: 140 MMHG

## 2021-01-12 DIAGNOSIS — Z87.440 PERSONAL HISTORY OF URINARY (TRACT) INFECTIONS: ICD-10-CM

## 2021-01-12 PROCEDURE — 99072 ADDL SUPL MATRL&STAF TM PHE: CPT

## 2021-01-12 PROCEDURE — 99213 OFFICE O/P EST LOW 20 MIN: CPT

## 2021-01-12 NOTE — HISTORY OF PRESENT ILLNESS
[FreeTextEntry8] : 74-year-old female with history ofobesity/prediabetes, recurrent UTIs, about 3 in the past, who now presents with dysuria and frequency since Friday, 4 days ago.  Patient denies fever chills leg pain, flank pain and hematuria.  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  Patient may be not drinking enough water.  Likes to drink coffee and tea, several cups of coffee and tea with sugar.  Patient denies polyuria, polydipsia and has a physical scheduled in April 21 with her PCP.

## 2021-01-12 NOTE — REVIEW OF SYSTEMS
[Dysuria] : dysuria [Incontinence] : no incontinence [Nocturia] : no nocturia [Hematuria] : no hematuria [Frequency] : frequency [Vaginal Discharge] : no vaginal discharge [Negative] : Heme/Lymph

## 2021-01-12 NOTE — PHYSICAL EXAM
[Normal] : no acute distress, well nourished, well developed and well-appearing [Soft] : abdomen soft [No HSM] : no HSM [No CVA Tenderness] : no CVA  tenderness

## 2021-01-22 LAB — BACTERIA UR CULT: ABNORMAL

## 2021-02-19 ENCOUNTER — EMERGENCY (EMERGENCY)
Facility: HOSPITAL | Age: 76
LOS: 1 days | Discharge: HOME | End: 2021-02-19
Attending: EMERGENCY MEDICINE
Payer: MEDICARE

## 2021-02-19 VITALS
HEART RATE: 93 BPM | RESPIRATION RATE: 18 BRPM | SYSTOLIC BLOOD PRESSURE: 153 MMHG | TEMPERATURE: 98 F | DIASTOLIC BLOOD PRESSURE: 88 MMHG | OXYGEN SATURATION: 100 %

## 2021-02-19 VITALS
HEIGHT: 59 IN | WEIGHT: 149.91 LBS | TEMPERATURE: 98 F | RESPIRATION RATE: 20 BRPM | DIASTOLIC BLOOD PRESSURE: 105 MMHG | OXYGEN SATURATION: 97 % | SYSTOLIC BLOOD PRESSURE: 178 MMHG | HEART RATE: 96 BPM

## 2021-02-19 DIAGNOSIS — Z98.49 CATARACT EXTRACTION STATUS, UNSPECIFIED EYE: Chronic | ICD-10-CM

## 2021-02-19 PROCEDURE — 99283 EMERGENCY DEPT VISIT LOW MDM: CPT

## 2021-02-19 RX ORDER — OXYMETAZOLINE HYDROCHLORIDE 0.5 MG/ML
1 SPRAY NASAL ONCE
Refills: 0 | Status: COMPLETED | OUTPATIENT
Start: 2021-02-19 | End: 2021-02-19

## 2021-02-19 NOTE — ED ADULT NURSE NOTE - OBJECTIVE STATEMENT
75y female from triage, a/o x3, complaining of epitaxies, not currently bleeding, denies falls, not on anticoagulation, PMH hypothyroidism, denies all other symptoms, bed in lowest position, comfort and safety provided.

## 2021-02-19 NOTE — ED PROVIDER NOTE - NSFOLLOWUPINSTRUCTIONS_ED_ALL_ED_FT
You were seen in the Emergency Department for nose bleed. We stopped the bleeding. You can use this nose spray if you start bleeding again. Please hold pressure on your nose if you have bleeding. You can also use normal ocean spray for your nose and a humidifier at home to prevent your nose from drying out.    1) Advance activity as tolerated.   2) Continue all previously prescribed medications as directed.    3) Follow up with your primary care physician in 24-48 hours - take copies of your results.    4) Return to the Emergency Department for worsening or persistent symptoms, and/or ANY NEW OR CONCERNING SYMPTOMS.

## 2021-02-19 NOTE — ED PROVIDER NOTE - PATIENT PORTAL LINK FT
You can access the FollowMyHealth Patient Portal offered by [facility] by registering at the following website: [email or web address]. By joining [employer]’s FollowMyHealth portal, you will also be able to view your health information using other applications (apps) compatible with our system.

## 2021-02-19 NOTE — ED PROVIDER NOTE - CLINICAL SUMMARY MEDICAL DECISION MAKING FREE TEXT BOX
Pt not on ac or nsaids w/ spontaneous nose bleed. VSS. Superficial area of bleeding located on exam. Will do afrin and pressure. Reassess.

## 2021-02-19 NOTE — ED PROVIDER NOTE - ATTENDING CONTRIBUTION TO CARE
Right nares: dried blood, punctate region fresh bleeding anterior middle septum.    Bleeding controlled with Afrin and nasal pressure

## 2021-02-19 NOTE — ED PROVIDER NOTE - OBJECTIVE STATEMENT
76yo F hx of prediabetes and HLD comes to ED for epistaxis. Woke from sleep to go to bathroom. Noticed nose bleed from R nostril. No anticoagulation, no NSAIDs, no trauma, did not pick nose or blow nose. Says she blew out a clot then the bleeding kind of stopped after 5 min. Otherwise no medical complaints, no f/c, n/v, cp, sob, abdominal pain, change in urine, or melanotic/bloody stools.

## 2021-02-19 NOTE — ED ADULT TRIAGE NOTE - HEART RATE (BEATS/MIN)
96 

## 2021-02-19 NOTE — ED PROVIDER NOTE - NS ED ROS FT
Constitutional: no fevers, chills  HEENT: no cough, rhinorrhea +nose bleed  Cardiac: no chest pain, palpitations  Respiratory: no SOB  GI: no n/v, abd pain, bloody or dark stools  : no dysuria, frequency, or hematuria  MSK: no joint pain  Skin: no rashes  Neuro: no headache, change in vision, weakness  Psych: negative

## 2021-02-19 NOTE — ED ADULT TRIAGE NOTE - NS ED NURSE BANDS TYPE
Dr. Jeff Alicia, please see refill request for patient, thank you! Requested Prescriptions     Pending Prescriptions Disp Refills    lisinopril (PRINIVIL, ZESTRIL) 20 mg tablet 30 Tab 2     Sig: Take 1 Tab by mouth daily.
Dr. Na Sandoval,     Please read the message below and advise on what you would like patient to do.  Thank you
Pls pend lisinopril 20 mg if she has not tried it in the past. #30 x 2 refills.
Pt is calling to follow up on BP medication. She was prescribed Adipex and was told that will increase her BP and she is already on BP med labetalol twice a day. Pt is asking if there is another BP med she can be taking once a day only because she thinks will get confused with taking too many pills. Please advise.
Name band;

## 2021-02-19 NOTE — ED PROVIDER NOTE - PHYSICAL EXAMINATION
General: non-toxic, NAD  HEENT: NCAT, PERRL +clotted blood in R nostril, +small area of superficial bleeding in inferior medial nostril, anterior.  Cardiac: RRR, no murmurs, 2+ peripheral pulses  Chest: CTA  Abdomen: soft, non-distended, bowel sounds present, no ttp, no rebound or guarding  Extremities: no peripheral edema  Skin: no rashes  Neuro: AAOx4, 5+motor, sensory grossly intact  Psych: mood and affect appropriate

## 2021-03-02 ENCOUNTER — APPOINTMENT (OUTPATIENT)
Dept: INTERNAL MEDICINE | Facility: CLINIC | Age: 76
End: 2021-03-02
Payer: MEDICARE

## 2021-03-02 VITALS — SYSTOLIC BLOOD PRESSURE: 120 MMHG | DIASTOLIC BLOOD PRESSURE: 80 MMHG

## 2021-03-02 VITALS
SYSTOLIC BLOOD PRESSURE: 142 MMHG | DIASTOLIC BLOOD PRESSURE: 78 MMHG | HEART RATE: 94 BPM | OXYGEN SATURATION: 97 % | WEIGHT: 150 LBS | BODY MASS INDEX: 31.9 KG/M2

## 2021-03-02 DIAGNOSIS — R04.0 EPISTAXIS: ICD-10-CM

## 2021-03-02 PROCEDURE — 99072 ADDL SUPL MATRL&STAF TM PHE: CPT

## 2021-03-02 PROCEDURE — 99214 OFFICE O/P EST MOD 30 MIN: CPT

## 2021-03-02 NOTE — PHYSICAL EXAM
[No Acute Distress] : no acute distress [Well Nourished] : well nourished [Well Developed] : well developed [Normal Sclera/Conjunctiva] : normal sclera/conjunctiva [PERRL] : pupils equal round and reactive to light [Normal Outer Ear/Nose] : the outer ears and nose were normal in appearance [Normal TMs] : both tympanic membranes were normal [Normal Nasal Mucosa] : the nasal mucosa was normal [No Respiratory Distress] : no respiratory distress  [No Accessory Muscle Use] : no accessory muscle use [Clear to Auscultation] : lungs were clear to auscultation bilaterally [Normal Rate] : normal rate  [Regular Rhythm] : with a regular rhythm [Normal S1, S2] : normal S1 and S2 [Soft] : abdomen soft [Non Tender] : non-tender [No HSM] : no HSM [No CVA Tenderness] : no CVA  tenderness

## 2021-03-02 NOTE — HISTORY OF PRESENT ILLNESS
[FreeTextEntry8] : 74 y/o F here for acute visit\yfn Had an episode of UTI in January, treated with macrobid- resolved.\yfn Had a recurrence last week, currently on Macrobid- helping.Took for 3/7 days so far. Dysuria resolved, frequency and urgency relieved\yfn Also had an episode of epistaxis one week ago, went to the ED, gave her a nasal decongestant spray. The following day, got another episode of epistaxis Left nostril, used the spray, helped. Bleeding stopped, no recurrence since then. \par She would also like her bp checked as when she went to the ED for epistaxis, her bp was elevated. She was worried she was having a stroke.\par She is not sexually active. \par \par

## 2021-03-02 NOTE — ASSESSMENT
[FreeTextEntry1] : 76 y/o F here for acute visit\par -She is currently on d 3/7 of Macrobid for UTI. Symptoms are improved and we have the benefit of a culture in January showing susceptibility to Macrobid. \par Urine collected today is clear. Will hold on repeat ucx at present given she is already taking antibiotics\par Given multiple recurrent episodes, will refer to urology for further evaluation.\par -Epistaxis: No further recurrence. Monitor for now. If recurrence, consider ENT eval.\par -BP wnl here in office today- reassurance\par Encouraged to obtain Covid vaccine.\par

## 2021-04-01 NOTE — ED ADULT NURSE NOTE - LOCATION
Patient calling to request a new cream for face as Aclovate is not covered by her insurance.  Reviewed with Dr. Wallace if another cream could be ordered and advised to have patient ask pharmacist if there is an equivalent medication that is same strength that is covered with her insurance.  Patient notified and will contact pharmacy.   urethra

## 2021-04-14 ENCOUNTER — APPOINTMENT (OUTPATIENT)
Dept: UROLOGY | Facility: CLINIC | Age: 76
End: 2021-04-14
Payer: MEDICARE

## 2021-04-14 VITALS — HEART RATE: 74 BPM | SYSTOLIC BLOOD PRESSURE: 144 MMHG | TEMPERATURE: 98.7 F | DIASTOLIC BLOOD PRESSURE: 89 MMHG

## 2021-04-14 PROCEDURE — 99072 ADDL SUPL MATRL&STAF TM PHE: CPT

## 2021-04-14 PROCEDURE — 99203 OFFICE O/P NEW LOW 30 MIN: CPT

## 2021-04-14 NOTE — HISTORY OF PRESENT ILLNESS
[FreeTextEntry1] : 75 year old F with cc of recurrent UTI. Pt reports that she has had issues this year with recurrent infection. Had  infection in Jan and Feb. Sx during an infection include dysuria and increased frequency and feelings of incomplete emptying and odor. Appears to be culture proven. UCx in Jan showed citrobacter. No cx on second episode. SHe was treated both times with macrobid. No febrile infections. Last on abx completed 1mo ago. SHe had episode of pyelo 5y ago. \par \par Drinks occasional herbal tea but mostly water. At baseline, no urinary complaints. No issues with constipation. No hx of stones. Had abd US 7/2019 that was normal. \par

## 2021-04-14 NOTE — ASSESSMENT
[FreeTextEntry1] : Unclear with timing if this was recurrent infection vs persistence. DIscussed recurrent UTI eval and tx vs observation and assumption that this was persistence. Pt wishes to observe. \par --UA, UCx to ensure clearance. Will call pt with results\par --Encourage fluid itnake Goal of 1.5L of water per day\par --If another infection, begin estrace and obtain anatomic eval\par

## 2021-04-19 ENCOUNTER — APPOINTMENT (OUTPATIENT)
Dept: GASTROENTEROLOGY | Facility: CLINIC | Age: 76
End: 2021-04-19
Payer: MEDICARE

## 2021-04-19 VITALS
OXYGEN SATURATION: 98 % | DIASTOLIC BLOOD PRESSURE: 80 MMHG | WEIGHT: 150 LBS | BODY MASS INDEX: 31.9 KG/M2 | SYSTOLIC BLOOD PRESSURE: 120 MMHG | HEART RATE: 90 BPM | TEMPERATURE: 97 F

## 2021-04-19 DIAGNOSIS — Z86.010 PERSONAL HISTORY OF COLONIC POLYPS: ICD-10-CM

## 2021-04-19 DIAGNOSIS — Z80.0 FAMILY HISTORY OF MALIGNANT NEOPLASM OF DIGESTIVE ORGANS: ICD-10-CM

## 2021-04-19 LAB
APPEARANCE: CLEAR
BACTERIA UR CULT: NORMAL
BACTERIA: NEGATIVE
BILIRUBIN URINE: NEGATIVE
BLOOD URINE: NEGATIVE
COLOR: COLORLESS
GLUCOSE QUALITATIVE U: NEGATIVE
HYALINE CASTS: 0 /LPF
KETONES URINE: NEGATIVE
LEUKOCYTE ESTERASE URINE: NEGATIVE
MICROSCOPIC-UA: NORMAL
NITRITE URINE: NEGATIVE
PH URINE: 6.5
PROTEIN URINE: NEGATIVE
RED BLOOD CELLS URINE: 1 /HPF
SPECIFIC GRAVITY URINE: 1.01
SQUAMOUS EPITHELIAL CELLS: 0 /HPF
UROBILINOGEN URINE: NORMAL
WHITE BLOOD CELLS URINE: 0 /HPF

## 2021-04-19 PROCEDURE — 99214 OFFICE O/P EST MOD 30 MIN: CPT

## 2021-04-19 PROCEDURE — 99072 ADDL SUPL MATRL&STAF TM PHE: CPT

## 2021-04-19 NOTE — ASSESSMENT
[FreeTextEntry1] : This is a 75-year-old female complaining of right-sided abdominal pain associated with sitting for prolonged periods of time or bending over to the right side.  The pain is sharp in nature and lasts only a few minutes.  The pain occurs only several times a month.  The pain is not associated with eating or bowel movements.  She does not feel that this is gas pains.  I reviewed results of CAT scan abdomen pelvis from November 2020 ordered for abdominal pain which was unremarkable.  She underwent a colonoscopy in 2019 and was found to have a small tubular adenoma.  She reports family history of colon cancer in a nephew.  I explained to her that I do not have a good explanation to her abdominal pain.  She has a benign abdominal exam.  Given the pain is associated with movement i.e. sitting for prolonged periods of time and bending over, there is a possibility of musculoskeletal cause of pain.  CT abdomen pelvis was unremarkable.  I recommend a colonoscopy given her unexplained abdominal pain, a small tubular adenoma found on previous colonoscopy, and family history of colon cancer.  I explained to her the risks, alternatives and benefits to a colonoscopy.  Risk including but not limited to bleeding, perforation, infection and adverse medication reaction.  Questions were answered.  She stated understanding.

## 2021-04-19 NOTE — REASON FOR VISIT
[Follow-Up: _____] : a [unfilled] follow-up visit [FreeTextEntry1] : abdominal pain, hx of adenomatous colon polyp

## 2021-04-19 NOTE — HISTORY OF PRESENT ILLNESS
[FreeTextEntry1] : Usha presents for follow-up visit.  She relates that she has been experiencing a right-sided abdominal pain, to the right of the umbilicus, worse when she is sitting for prolonged periods of time driving or when she is bending over.  The pain is described as sharp in nature sometimes moving, and not associated with nausea, vomiting or change in bowel habits.  She reports at times multiple bowel movements but not related to her complaints of right-sided abdominal pain.  She denies rectal bleeding or melena.  She denies weight loss or anemia.  She does not believe that the pain is associated with eating or bowel movements.  He does not believe that certain foods aggravate her GI symptoms.  She keeps mentioning that the pain is worse when she is driving and when she bends over to the right.

## 2021-04-29 ENCOUNTER — NON-APPOINTMENT (OUTPATIENT)
Age: 76
End: 2021-04-29

## 2021-04-30 ENCOUNTER — APPOINTMENT (OUTPATIENT)
Dept: INTERNAL MEDICINE | Facility: CLINIC | Age: 76
End: 2021-04-30
Payer: MEDICARE

## 2021-04-30 VITALS
DIASTOLIC BLOOD PRESSURE: 80 MMHG | OXYGEN SATURATION: 97 % | WEIGHT: 149 LBS | BODY MASS INDEX: 31.71 KG/M2 | HEIGHT: 57.5 IN | HEART RATE: 84 BPM | SYSTOLIC BLOOD PRESSURE: 126 MMHG

## 2021-04-30 DIAGNOSIS — Z87.440 PERSONAL HISTORY OF URINARY (TRACT) INFECTIONS: ICD-10-CM

## 2021-04-30 PROCEDURE — 99072 ADDL SUPL MATRL&STAF TM PHE: CPT

## 2021-04-30 PROCEDURE — G0439: CPT

## 2021-04-30 PROCEDURE — G0444 DEPRESSION SCREEN ANNUAL: CPT

## 2021-04-30 RX ORDER — NITROFURANTOIN (MONOHYDRATE/MACROCRYSTALS) 25; 75 MG/1; MG/1
100 CAPSULE ORAL
Qty: 14 | Refills: 0 | Status: DISCONTINUED | COMMUNITY
Start: 2021-01-12 | End: 2021-04-30

## 2021-04-30 RX ORDER — NITROFURANTOIN MACROCRYSTALS 100 MG/1
100 CAPSULE ORAL
Qty: 14 | Refills: 0 | Status: DISCONTINUED | COMMUNITY
Start: 2021-02-27 | End: 2021-04-30

## 2021-05-18 LAB
25(OH)D3 SERPL-MCNC: 42.7 NG/ML
ALBUMIN MFR SERPL ELPH: 54.5 %
ALBUMIN SERPL ELPH-MCNC: 4.5 G/DL
ALBUMIN SERPL-MCNC: 4.1 G/DL
ALBUMIN/GLOB SERPL: 1.2 RATIO
ALP BLD-CCNC: 91 U/L
ALPHA1 GLOB MFR SERPL ELPH: 3.8 %
ALPHA1 GLOB SERPL ELPH-MCNC: 0.3 G/DL
ALPHA2 GLOB MFR SERPL ELPH: 10.7 %
ALPHA2 GLOB SERPL ELPH-MCNC: 0.8 G/DL
ALT SERPL-CCNC: 34 U/L
ANION GAP SERPL CALC-SCNC: 11 MMOL/L
AST SERPL-CCNC: 25 U/L
B-GLOBULIN MFR SERPL ELPH: 11.7 %
B-GLOBULIN SERPL ELPH-MCNC: 0.9 G/DL
BASOPHILS # BLD AUTO: 0.03 K/UL
BASOPHILS NFR BLD AUTO: 0.5 %
BILIRUB SERPL-MCNC: 0.3 MG/DL
BUN SERPL-MCNC: 17 MG/DL
CALCIUM SERPL-MCNC: 9.9 MG/DL
CHLORIDE SERPL-SCNC: 105 MMOL/L
CHOLEST SERPL-MCNC: 160 MG/DL
CO2 SERPL-SCNC: 23 MMOL/L
CREAT SERPL-MCNC: 0.72 MG/DL
EOSINOPHIL # BLD AUTO: 0.09 K/UL
EOSINOPHIL NFR BLD AUTO: 1.6 %
ESTIMATED AVERAGE GLUCOSE: 123 MG/DL
GAMMA GLOB FLD ELPH-MCNC: 1.4 G/DL
GAMMA GLOB MFR SERPL ELPH: 19.3 %
GLUCOSE SERPL-MCNC: 99 MG/DL
HBA1C MFR BLD HPLC: 5.9 %
HCT VFR BLD CALC: 39.6 %
HDLC SERPL-MCNC: 41 MG/DL
HGB BLD-MCNC: 13 G/DL
IMM GRANULOCYTES NFR BLD AUTO: 0.4 %
INTERPRETATION SERPL IEP-IMP: NORMAL
LDLC SERPL CALC-MCNC: 98 MG/DL
LYMPHOCYTES # BLD AUTO: 1.96 K/UL
LYMPHOCYTES NFR BLD AUTO: 34.7 %
MAN DIFF?: NORMAL
MCHC RBC-ENTMCNC: 29 PG
MCHC RBC-ENTMCNC: 32.8 GM/DL
MCV RBC AUTO: 88.4 FL
MONOCYTES # BLD AUTO: 0.42 K/UL
MONOCYTES NFR BLD AUTO: 7.4 %
NEUTROPHILS # BLD AUTO: 3.13 K/UL
NEUTROPHILS NFR BLD AUTO: 55.4 %
NONHDLC SERPL-MCNC: 119 MG/DL
PLATELET # BLD AUTO: 295 K/UL
POTASSIUM SERPL-SCNC: 4.7 MMOL/L
PROT SERPL-MCNC: 7.5 G/DL
RBC # BLD: 4.48 M/UL
RBC # FLD: 14.6 %
SODIUM SERPL-SCNC: 139 MMOL/L
TRIGL SERPL-MCNC: 105 MG/DL
TSH SERPL-ACNC: 0.72 UIU/ML
WBC # FLD AUTO: 5.65 K/UL

## 2021-05-31 ENCOUNTER — APPOINTMENT (OUTPATIENT)
Dept: DISASTER EMERGENCY | Facility: CLINIC | Age: 76
End: 2021-05-31

## 2021-06-24 ENCOUNTER — OUTPATIENT (OUTPATIENT)
Dept: OUTPATIENT SERVICES | Facility: HOSPITAL | Age: 76
LOS: 1 days | End: 2021-06-24
Payer: MEDICARE

## 2021-06-24 ENCOUNTER — APPOINTMENT (OUTPATIENT)
Dept: GASTROENTEROLOGY | Facility: HOSPITAL | Age: 76
End: 2021-06-24

## 2021-06-24 VITALS
HEIGHT: 59 IN | TEMPERATURE: 97 F | WEIGHT: 149.91 LBS | DIASTOLIC BLOOD PRESSURE: 85 MMHG | SYSTOLIC BLOOD PRESSURE: 125 MMHG | RESPIRATION RATE: 21 BRPM | OXYGEN SATURATION: 98 % | HEART RATE: 101 BPM

## 2021-06-24 VITALS
OXYGEN SATURATION: 100 % | RESPIRATION RATE: 16 BRPM | HEART RATE: 75 BPM | SYSTOLIC BLOOD PRESSURE: 122 MMHG | DIASTOLIC BLOOD PRESSURE: 85 MMHG

## 2021-06-24 DIAGNOSIS — Z80.0 FAMILY HISTORY OF MALIGNANT NEOPLASM OF DIGESTIVE ORGANS: ICD-10-CM

## 2021-06-24 DIAGNOSIS — Z98.49 CATARACT EXTRACTION STATUS, UNSPECIFIED EYE: Chronic | ICD-10-CM

## 2021-06-24 DIAGNOSIS — Z86.010 PERSONAL HISTORY OF COLONIC POLYPS: ICD-10-CM

## 2021-06-24 PROCEDURE — 45378 DIAGNOSTIC COLONOSCOPY: CPT | Mod: GC

## 2021-06-24 PROCEDURE — 45378 DIAGNOSTIC COLONOSCOPY: CPT

## 2021-06-24 RX ORDER — SODIUM CHLORIDE 9 MG/ML
500 INJECTION INTRAMUSCULAR; INTRAVENOUS; SUBCUTANEOUS
Refills: 0 | Status: COMPLETED | OUTPATIENT
Start: 2021-06-24 | End: 2021-06-24

## 2021-06-24 RX ADMIN — SODIUM CHLORIDE 30 MILLILITER(S): 9 INJECTION INTRAMUSCULAR; INTRAVENOUS; SUBCUTANEOUS at 10:44

## 2021-06-24 NOTE — PRE PROCEDURE NOTE - PRE PROCEDURE EVALUATION
Attending Physician:             Janae Webster               Procedure: colonoscopy    Indication for Procedure: abdominal pain, hx of adenomatous colon polyp, FH colon CA  ________________________________________________________  PAST MEDICAL & SURGICAL HISTORY:  Hypothyroid    Sjogren&#x27;s syndrome    H/O: hysterectomy    S/P cataract surgery      ALLERGIES:  No Known Allergies    HOME MEDICATIONS:  Synthroid 75 mcg (0.075 mg) oral tablet: 1 tab(s) orally once a day    AICD/PPM: [ ] yes   [x ] no    PERTINENT LAB DATA:                      PHYSICAL EXAMINATION:    T(C): --  HR: --  BP: --  RR: --  SpO2: --    Constitutional: NAD  HEENT: PERRLA, EOMI,    Neck:  No JVD  Respiratory: CTAB/L  Cardiovascular: S1 and S2  Gastrointestinal: BS+, soft, NT/ND  Extremities: No peripheral edema  Neurological: A/O x 3, no focal deficits  Psychiatric: Normal mood, normal affect  Skin: No rashes    ASA Class: I [ ]  II [ ]  III [ ]  IV [ ]    COMMENTS:    The patient is a suitable candidate for the planned procedure unless box checked [ ]  No, explain:

## 2021-07-06 ENCOUNTER — RESULT REVIEW (OUTPATIENT)
Age: 76
End: 2021-07-06

## 2021-07-06 ENCOUNTER — OUTPATIENT (OUTPATIENT)
Dept: OUTPATIENT SERVICES | Facility: HOSPITAL | Age: 76
LOS: 1 days | End: 2021-07-06
Payer: MEDICARE

## 2021-07-06 ENCOUNTER — APPOINTMENT (OUTPATIENT)
Dept: MAMMOGRAPHY | Facility: IMAGING CENTER | Age: 76
End: 2021-07-06
Payer: MEDICARE

## 2021-07-06 ENCOUNTER — APPOINTMENT (OUTPATIENT)
Dept: RADIOLOGY | Facility: IMAGING CENTER | Age: 76
End: 2021-07-06
Payer: MEDICARE

## 2021-07-06 DIAGNOSIS — Z00.00 ENCOUNTER FOR GENERAL ADULT MEDICAL EXAMINATION WITHOUT ABNORMAL FINDINGS: ICD-10-CM

## 2021-07-06 DIAGNOSIS — M85.80 OTHER SPECIFIED DISORDERS OF BONE DENSITY AND STRUCTURE, UNSPECIFIED SITE: ICD-10-CM

## 2021-07-06 DIAGNOSIS — Z00.8 ENCOUNTER FOR OTHER GENERAL EXAMINATION: ICD-10-CM

## 2021-07-06 DIAGNOSIS — Z98.49 CATARACT EXTRACTION STATUS, UNSPECIFIED EYE: Chronic | ICD-10-CM

## 2021-07-06 PROCEDURE — 77067 SCR MAMMO BI INCL CAD: CPT | Mod: 26

## 2021-07-06 PROCEDURE — 77080 DXA BONE DENSITY AXIAL: CPT | Mod: 26

## 2021-07-06 PROCEDURE — 77063 BREAST TOMOSYNTHESIS BI: CPT

## 2021-07-06 PROCEDURE — 77067 SCR MAMMO BI INCL CAD: CPT

## 2021-07-06 PROCEDURE — 77063 BREAST TOMOSYNTHESIS BI: CPT | Mod: 26

## 2021-07-06 PROCEDURE — 77080 DXA BONE DENSITY AXIAL: CPT

## 2021-07-20 ENCOUNTER — NON-APPOINTMENT (OUTPATIENT)
Age: 76
End: 2021-07-20

## 2021-07-20 NOTE — REVIEW OF SYSTEMS
[Joint Pain] : joint pain [Negative] : ENT [Fever] : no fever [Chills] : no chills [Fatigue] : no fatigue [Recent Change In Weight] : ~T no recent weight change [Chest Pain] : no chest pain [Palpitations] : no palpitations [Shortness Of Breath] : no shortness of breath [Cough] : no cough [Abdominal Pain] : no abdominal pain [Nausea] : no nausea [Constipation] : no constipation [Diarrhea] : diarrhea [Vomiting] : no vomiting [Heartburn] : no heartburn [Melena] : no melena [Dysuria] : no dysuria [Vaginal Discharge] : no vaginal discharge [Skin Rash] : no skin rash [Dizziness] : no dizziness [Fainting] : no fainting [Anxiety] : no anxiety [Depression] : no depression [Easy Bleeding] : no easy bleeding [Easy Bruising] : no easy bruising [Swollen Glands] : no swollen glands [FreeTextEntry4] : had episode of epistaxis [FreeTextEntry9] : some knee pain at times

## 2021-07-20 NOTE — ASSESSMENT
[FreeTextEntry1] : 74 yo female with h/o as above including hypothyroidism, osteopenia, preDM, sjogren's syndrome, here for CPE.\par 1.  CV - bp at goal, check lipids\par 2.  Endo - check TSH, mild thyroid prominence but had nl thyroid sono last year; check vitamin D, rx dexa given as due, check A1c\par 3.  MSK/Rheum - sjogren's ab positive, referred to rheum, sees optho, will check for gammopathy\par 4.  Gyn - no further paps needed for age; rx mammo given as due\par 5.  GI - awaiting repeat colonoscopy in few months\par 6.  HCM - check below labs; to choose hcp and molst form given; consider shingrix/tdap (to see where covered by insurance), other vaccines utd\par 7. RTO prn or 1 year\par \par \par

## 2021-07-20 NOTE — ADDENDUM
[FreeTextEntry1] : 5/18/21:  Reviewed labs, nl except LDL 98 slightly elevated (had declined statin previously, advised diet), A1c 5.9 c/w stable preDM to work on diet as well, other labs nl - mailed to pt.\par 7/20/21:  Mammo birads 1, dexa showed osteopenia with low frax score - mailed to pt.

## 2021-07-20 NOTE — HEALTH RISK ASSESSMENT
[Fully functional (bathing, dressing, toileting, transferring, walking, feeding)] : Fully functional (bathing, dressing, toileting, transferring, walking, feeding) [Fully functional (using the telephone, shopping, preparing meals, housekeeping, doing laundry, using] : Fully functional and needs no help or supervision to perform IADLs (using the telephone, shopping, preparing meals, housekeeping, doing laundry, using transportation, managing medications and managing finances) [No falls in past year] : Patient reported no falls in the past year [0] : 2) Feeling down, depressed, or hopeless: Not at all (0) [MammogramDate] : 03/20 [MammogramComments] : due now [PapSmearComments] : no longer [BoneDensityComments] : osteopenia [BoneDensityDate] : 11/18 [ColonoscopyComments] : utd, due 1/2022 (3 year f/up) [AdvancecareDate] : 04/21

## 2021-07-20 NOTE — HISTORY OF PRESENT ILLNESS
[de-identified] : 74 yo female with h/o as below here for CPE\par Went to see  because of recurrent UTIs,  believed that infection in January didn't get fully treated.  Was drinking type of tea both times and wasn't sure if related, now drinking water.  No further urinary symptoms now.\par Saw GI for upper abdominal pain as well, suggested repeat colonoscopy for polyps, awaiting.\par No other active issues. [FreeTextEntry1] : physical

## 2021-07-20 NOTE — PHYSICAL EXAM
[No Acute Distress] : no acute distress [Well Nourished] : well nourished [Well Developed] : well developed [Well-Appearing] : well-appearing [PERRL] : pupils equal round and reactive to light [EOMI] : extraocular movements intact [Normal TMs] : both tympanic membranes were normal [No Lymphadenopathy] : no lymphadenopathy [Supple] : supple [No Respiratory Distress] : no respiratory distress  [No Accessory Muscle Use] : no accessory muscle use [Clear to Auscultation] : lungs were clear to auscultation bilaterally [Normal Rate] : normal rate  [Regular Rhythm] : with a regular rhythm [Normal S1, S2] : normal S1 and S2 [No Murmur] : no murmur heard [No Carotid Bruits] : no carotid bruits [Pedal Pulses Present] : the pedal pulses are present [No Edema] : there was no peripheral edema [Normal Appearance] : normal in appearance [No Nipple Discharge] : no nipple discharge [No Axillary Lymphadenopathy] : no axillary lymphadenopathy [Soft] : abdomen soft [Non Tender] : non-tender [Non-distended] : non-distended [No Masses] : no abdominal mass palpated [No HSM] : no HSM [Normal Bowel Sounds] : normal bowel sounds [Normal Supraclavicular Nodes] : no supraclavicular lymphadenopathy [Normal Axillary Nodes] : no axillary lymphadenopathy [Normal Posterior Cervical Nodes] : no posterior cervical lymphadenopathy [Normal Anterior Cervical Nodes] : no anterior cervical lymphadenopathy [Normal Inguinal Nodes] : no inguinal lymphadenopathy [No Joint Swelling] : no joint swelling [No Rash] : no rash [Normal Gait] : normal gait [Normal Affect] : the affect was normal [de-identified] : ?fullness to thyroid

## 2021-08-02 ENCOUNTER — APPOINTMENT (OUTPATIENT)
Dept: INTERNAL MEDICINE | Facility: CLINIC | Age: 76
End: 2021-08-02
Payer: MEDICARE

## 2021-08-02 VITALS
HEIGHT: 57.5 IN | BODY MASS INDEX: 31.71 KG/M2 | DIASTOLIC BLOOD PRESSURE: 70 MMHG | OXYGEN SATURATION: 99 % | WEIGHT: 149 LBS | SYSTOLIC BLOOD PRESSURE: 120 MMHG | HEART RATE: 105 BPM

## 2021-08-02 DIAGNOSIS — Z01.818 ENCOUNTER FOR OTHER PREPROCEDURAL EXAMINATION: ICD-10-CM

## 2021-08-02 PROCEDURE — 99213 OFFICE O/P EST LOW 20 MIN: CPT

## 2021-08-03 PROBLEM — Z01.818 PREOP TESTING: Status: RESOLVED | Noted: 2021-05-31 | Resolved: 2021-08-03

## 2021-08-03 NOTE — PHYSICAL EXAM
[No Acute Distress] : no acute distress [Well Nourished] : well nourished [Well Developed] : well developed [Well-Appearing] : well-appearing [Supple] : supple [No Respiratory Distress] : no respiratory distress  [No Accessory Muscle Use] : no accessory muscle use [Clear to Auscultation] : lungs were clear to auscultation bilaterally [No Edema] : there was no peripheral edema [Soft] : abdomen soft [Non Tender] : non-tender [Non-distended] : non-distended [No Masses] : no abdominal mass palpated [No HSM] : no HSM [Normal Bowel Sounds] : normal bowel sounds [No Joint Swelling] : no joint swelling [No Rash] : no rash [Normal Gait] : normal gait [Normal Affect] : the affect was normal [de-identified] : no RUQ abdominal tenderness or palpable abnl sitting forward or laying supine

## 2021-08-03 NOTE — ASSESSMENT
[FreeTextEntry1] : 74 yo female with h/o as above including hypothyroidism here for continued episodes of severe RUQ abdominal pain when sitting in the car for long periods of time or bending forward, of ?etiology, with nl CT A/P and nl colonoscopy (except for diverticulosis).  ?due to hernia based on description, will refer to surgery Dr. Minh Ball for eval.  RTO prn.

## 2021-08-03 NOTE — HISTORY OF PRESENT ILLNESS
[FreeTextEntry1] : abdominal pain [de-identified] : 76 yo female with h/o as below here with continued intermittent RUQ abdominal pain.  Pain same as before, will occur typically after driving for long period of time, will suddenly develop significant/severe pain in that same area like before, will try to stretch or move around to get rid of the pain, ultimately will have to get out of the car and walk around and pain will eventually subside.  Will sometimes have abdominal pain when leaning forward to get something, bending down.  No other particular triggers.  No nausea/vomiting, no change in stool consistency, no other new symptoms.  Had colonoscopy recently showing diverticulosis, not sure if cause of symptoms.  Has had unrevealing abdominal imaging in the past.  Pain really impacting her life as she is unable to go on long car ride due to these episodes of pain. No other active issues.

## 2021-08-30 ENCOUNTER — APPOINTMENT (OUTPATIENT)
Dept: SURGERY | Facility: CLINIC | Age: 76
End: 2021-08-30
Payer: MEDICARE

## 2021-08-30 VITALS
DIASTOLIC BLOOD PRESSURE: 82 MMHG | HEIGHT: 58 IN | BODY MASS INDEX: 31.02 KG/M2 | SYSTOLIC BLOOD PRESSURE: 130 MMHG | OXYGEN SATURATION: 98 % | RESPIRATION RATE: 16 BRPM | WEIGHT: 147.8 LBS | TEMPERATURE: 97.6 F | HEART RATE: 73 BPM

## 2021-08-30 PROCEDURE — 99204 OFFICE O/P NEW MOD 45 MIN: CPT

## 2021-08-30 NOTE — HISTORY OF PRESENT ILLNESS
[de-identified] : Usha is a 75 year old female here for consultation of RUQ abdominal pain. \par \par US abdomen 6/7/19- normal abdominal ultrasound \par CT abdomen 11/10/20- unremarkable abdominal CT\par Colonoscopy 6/24/21 by Dr. Webster- diverticulosis in the sigmoid colon and in the ascending colon. the examination was otherwise normal. no specimens collected. \par \par Today pt reports feeling we, no pain. Pt reports RUQ pain for the last year, pain has increased, pt is worst when getting out of car. Pt reports BMs daily, no pain, no bleeding. Pt reports good appetite, no nausea, no vomiting. Not on anticoagulants. \par  [de-identified] : 75yF with Acute on Chronic vague diffuse non-specific exercise related Right Mid abdominal pain\par Exacerbated on Driving and strenuous activity\par Unrelated to meals\par No Dark urine /pale stool\par COmes on while driving\par sometimes has to pull over

## 2021-08-30 NOTE — PHYSICAL EXAM
[Normal Breath Sounds] : Normal breath sounds [Normal Heart Sounds] : normal heart sounds [No Rash or Lesion] : No rash or lesion [Alert] : alert [Oriented to Person] : oriented to person [Oriented to Place] : oriented to place [Oriented to Time] : oriented to time [Calm] : calm [No HSM] : no hepatosplenomegaly [JVD] : no jugular venous distention  [Right Carotid Bruit] : no bruit heard over the right carotid [Left Carotid Bruit] : no bruit heard over the left carotid [Abdominal Masses] : No abdominal masses [Abdomen Tenderness] : ~T ~M No abdominal tenderness [Tender] : was nontender [Enlarged] : not enlarged [de-identified] : WNL.obese [de-identified] : WNL [de-identified] : no masses [de-identified] : no organomegaly,soft ND NT ,No cough impulse, BS[+],o Lazo's Sign, No diastasis,  [de-identified] : NAD [de-identified] : DEFERRED [de-identified] : ROM WNL [de-identified] : INTACT ,TARIK iNCISION WELL HEALED

## 2021-08-30 NOTE — ASSESSMENT
[FreeTextEntry1] : 75Yf WITH Right mid-abdominal Pain , etiology not clear at present time possible etiologies Interstitial Hernia / GB Dyskinesia/related to GB, biliary colic, musculoskeletal.\par Will need a CT to evaluate abdominal wall,,HIDA,LFTs and USS GB better delineate biliary pathology.\par Recent Colonoscopy reviewed diverticulosis.\par The above was discussed with the patient all of their questions were answered.\par She will return after these tests are performed to discuss

## 2021-08-31 ENCOUNTER — APPOINTMENT (OUTPATIENT)
Dept: CT IMAGING | Facility: HOSPITAL | Age: 76
End: 2021-08-31

## 2021-09-01 ENCOUNTER — APPOINTMENT (OUTPATIENT)
Dept: INTERNAL MEDICINE | Facility: CLINIC | Age: 76
End: 2021-09-01

## 2021-09-07 ENCOUNTER — APPOINTMENT (OUTPATIENT)
Dept: NUCLEAR MEDICINE | Facility: HOSPITAL | Age: 76
End: 2021-09-07

## 2021-09-07 ENCOUNTER — APPOINTMENT (OUTPATIENT)
Dept: CT IMAGING | Facility: CLINIC | Age: 76
End: 2021-09-07

## 2021-09-13 LAB
ALBUMIN SERPL ELPH-MCNC: 4.6 G/DL
ALP BLD-CCNC: 96 U/L
ALT SERPL-CCNC: 40 U/L
AST SERPL-CCNC: 28 U/L
BILIRUB DIRECT SERPL-MCNC: 0.1 MG/DL
BILIRUB INDIRECT SERPL-MCNC: 0.3 MG/DL
BILIRUB SERPL-MCNC: 0.4 MG/DL
PROT SERPL-MCNC: 7.7 G/DL

## 2021-09-14 ENCOUNTER — OUTPATIENT (OUTPATIENT)
Dept: OUTPATIENT SERVICES | Facility: HOSPITAL | Age: 76
LOS: 1 days | End: 2021-09-14
Payer: MEDICARE

## 2021-09-14 ENCOUNTER — APPOINTMENT (OUTPATIENT)
Dept: ULTRASOUND IMAGING | Facility: CLINIC | Age: 76
End: 2021-09-14
Payer: MEDICARE

## 2021-09-14 ENCOUNTER — APPOINTMENT (OUTPATIENT)
Dept: CT IMAGING | Facility: CLINIC | Age: 76
End: 2021-09-14
Payer: MEDICARE

## 2021-09-14 ENCOUNTER — RESULT REVIEW (OUTPATIENT)
Age: 76
End: 2021-09-14

## 2021-09-14 DIAGNOSIS — Z98.49 CATARACT EXTRACTION STATUS, UNSPECIFIED EYE: Chronic | ICD-10-CM

## 2021-09-14 DIAGNOSIS — Z00.8 ENCOUNTER FOR OTHER GENERAL EXAMINATION: ICD-10-CM

## 2021-09-14 DIAGNOSIS — R10.11 RIGHT UPPER QUADRANT PAIN: ICD-10-CM

## 2021-09-14 PROCEDURE — 76705 ECHO EXAM OF ABDOMEN: CPT | Mod: 26

## 2021-09-14 PROCEDURE — 74177 CT ABD & PELVIS W/CONTRAST: CPT

## 2021-09-14 PROCEDURE — 74177 CT ABD & PELVIS W/CONTRAST: CPT | Mod: 26

## 2021-09-14 PROCEDURE — 76705 ECHO EXAM OF ABDOMEN: CPT

## 2021-09-14 PROCEDURE — 82565 ASSAY OF CREATININE: CPT

## 2021-09-17 ENCOUNTER — APPOINTMENT (OUTPATIENT)
Dept: NUCLEAR MEDICINE | Facility: HOSPITAL | Age: 76
End: 2021-09-17
Payer: MEDICARE

## 2021-09-17 ENCOUNTER — OUTPATIENT (OUTPATIENT)
Dept: OUTPATIENT SERVICES | Facility: HOSPITAL | Age: 76
LOS: 1 days | End: 2021-09-17

## 2021-09-17 DIAGNOSIS — R10.11 RIGHT UPPER QUADRANT PAIN: ICD-10-CM

## 2021-09-17 DIAGNOSIS — Z98.49 CATARACT EXTRACTION STATUS, UNSPECIFIED EYE: Chronic | ICD-10-CM

## 2021-09-17 PROCEDURE — 78227 HEPATOBIL SYST IMAGE W/DRUG: CPT | Mod: 26

## 2021-11-02 ENCOUNTER — APPOINTMENT (OUTPATIENT)
Dept: INTERNAL MEDICINE | Facility: CLINIC | Age: 76
End: 2021-11-02
Payer: MEDICARE

## 2021-11-02 VITALS
WEIGHT: 149 LBS | HEART RATE: 78 BPM | OXYGEN SATURATION: 98 % | BODY MASS INDEX: 31.28 KG/M2 | SYSTOLIC BLOOD PRESSURE: 120 MMHG | HEIGHT: 58 IN | DIASTOLIC BLOOD PRESSURE: 70 MMHG

## 2021-11-02 DIAGNOSIS — M54.14 RADICULOPATHY, THORACIC REGION: ICD-10-CM

## 2021-11-02 PROCEDURE — 99214 OFFICE O/P EST MOD 30 MIN: CPT

## 2021-11-02 NOTE — HISTORY OF PRESENT ILLNESS
[de-identified] : 77 yo female here to f/u on abdominal pain.  Had CT Abdomen which was essentially negative.  DeSida was neg.  Colonoscopy showed diverticulosis.  Pain happens with driving and with a twisting motion.

## 2021-11-02 NOTE — ASSESSMENT
[FreeTextEntry1] : 76-year-old female who presents for follow-up of continued right upper quadrant pain that is worse with a twisting motion.  She has had an extensive work-up and feels that something is pressing there.  CT scan of the abdomen pelvis was reviewed which was unremarkable.  She had a colonoscopy that showed diverticulosis which was reviewed.  I reviewed a DISIDA scan which was also negative.  Surgery note was also reviewed.  I suspect given that it is worse with a twisting motion and when sitting that this is more musculoskeletal in nature, possibly related to degenerative disc disease and kyphoscoliosis that she is does have signs of that on exam.  Will give a trial of Aleve 2 tablets twice a day for 7 days, can apply moist heat and avoid any twisting motion that would bring about this pain.  In addition we will send for x-rays of the spine to evaluate for kyphoscoliosis.  May consider doing an MRI or physical therapy.

## 2021-11-16 ENCOUNTER — APPOINTMENT (OUTPATIENT)
Dept: RADIOLOGY | Facility: CLINIC | Age: 76
End: 2021-11-16
Payer: MEDICARE

## 2021-11-16 ENCOUNTER — RESULT REVIEW (OUTPATIENT)
Age: 76
End: 2021-11-16

## 2021-11-16 ENCOUNTER — OUTPATIENT (OUTPATIENT)
Dept: OUTPATIENT SERVICES | Facility: HOSPITAL | Age: 76
LOS: 1 days | End: 2021-11-16
Payer: MEDICARE

## 2021-11-16 DIAGNOSIS — M54.14 RADICULOPATHY, THORACIC REGION: ICD-10-CM

## 2021-11-16 DIAGNOSIS — M41.9 SCOLIOSIS, UNSPECIFIED: ICD-10-CM

## 2021-11-16 DIAGNOSIS — Z98.49 CATARACT EXTRACTION STATUS, UNSPECIFIED EYE: Chronic | ICD-10-CM

## 2021-11-16 PROCEDURE — 72070 X-RAY EXAM THORAC SPINE 2VWS: CPT | Mod: 26

## 2021-11-16 PROCEDURE — 72070 X-RAY EXAM THORAC SPINE 2VWS: CPT

## 2021-11-16 PROCEDURE — 72100 X-RAY EXAM L-S SPINE 2/3 VWS: CPT

## 2021-11-16 PROCEDURE — 72100 X-RAY EXAM L-S SPINE 2/3 VWS: CPT | Mod: 26

## 2021-11-23 DIAGNOSIS — M54.50 LOW BACK PAIN, UNSPECIFIED: ICD-10-CM

## 2021-11-23 DIAGNOSIS — M54.6 LOW BACK PAIN, UNSPECIFIED: ICD-10-CM

## 2022-01-07 NOTE — ASU PATIENT PROFILE, ADULT - TEACHING/LEARNING LEARNING PREFERENCES
audio/pictorial/skill demonstration/verbal instruction Spine appears normal, movement of extremities grossly intact.

## 2022-01-27 ENCOUNTER — APPOINTMENT (OUTPATIENT)
Dept: INTERNAL MEDICINE | Facility: CLINIC | Age: 77
End: 2022-01-27
Payer: MEDICARE

## 2022-01-27 VITALS
HEART RATE: 80 BPM | OXYGEN SATURATION: 98 % | WEIGHT: 141 LBS | RESPIRATION RATE: 14 BRPM | BODY MASS INDEX: 29.6 KG/M2 | HEIGHT: 58 IN | DIASTOLIC BLOOD PRESSURE: 80 MMHG | SYSTOLIC BLOOD PRESSURE: 130 MMHG

## 2022-01-27 DIAGNOSIS — M41.9 SCOLIOSIS, UNSPECIFIED: ICD-10-CM

## 2022-01-27 DIAGNOSIS — M25.569 PAIN IN UNSPECIFIED KNEE: ICD-10-CM

## 2022-01-27 DIAGNOSIS — R10.11 RIGHT UPPER QUADRANT PAIN: ICD-10-CM

## 2022-01-27 DIAGNOSIS — Z80.0 FAMILY HISTORY OF MALIGNANT NEOPLASM OF DIGESTIVE ORGANS: ICD-10-CM

## 2022-01-27 PROCEDURE — 99213 OFFICE O/P EST LOW 20 MIN: CPT

## 2022-01-27 NOTE — PHYSICAL EXAM
[No CVA Tenderness] : no CVA  tenderness [No Spinal Tenderness] : no spinal tenderness [Kyphosis] : kyphosis [Scoliosis] : scoliosis [No Joint Swelling] : no joint swelling [Grossly Normal Strength/Tone] : grossly normal strength/tone [Coordination Grossly Intact] : coordination grossly intact [No Focal Deficits] : no focal deficits [Normal Gait] : normal gait [de-identified] : WDWN in NAD\par HEENT:  unremarkable\par Neck:  supple, no JVD, no LN\par Lungs:  CTA B/L, no W/R/R\par Heart:  Reg rate, +S1S2, no M/R/G\par Abdomen:  soft, NT, ND, +BS, no masses, no HS-megaly\par Genital: No pubic or genital lesions noted.\par Ext:  no C/C/E\par Neuro:  no focal deficits [de-identified] : hunched back. Denied spine and paraspine focal tenderness. [de-identified] : No evidence of patella effusion, ROM intact, mild crepitus under left knee cap. Negative for Mark, Lachman, posterior drawer test. Negative for Valgus/Varus stress test. No ataxia.

## 2022-01-27 NOTE — ASSESSMENT
[FreeTextEntry1] : # RUQ pain\par Currently no pain on exam. No GI/ issues.\par Ordered Acupuncture and pt will d/w her current physical therapist.\par Continue physical therapy and use NSAID. \par \par # b/l knee pain\par Most likely osteoarthritis given benign findings: no warmth, no stiffness.\par  No muscle weakness or paresthesia. \par Recommended Motrin 200mg po tid with food for 1 week then prn and hot wet massage to knees. \par If sx persists, consider knee xrays. \par \par RTC as needed.

## 2022-01-27 NOTE — HISTORY OF PRESENT ILLNESS
[FreeTextEntry1] : RPA [de-identified] : ALEKSANDR PADILLA  is a 76 year old female  with history of  Osteopenia, kyphoscoliosis, T/L spine DDD, diverticulosis, PreDM, Hypothyroidism, sjogrens syndrome presented today for knee pain.\par \par She has had an extensive work-up and feels that something is pressing right upper quadrant pain for about 4 years. Driving or sitting for a long time aggravated Rt middle abdomen pain. Stand up or stretching alleviated pain. Work up done with imaging, surgery, and GI. CT abd 9/14/21: negative. NM Gallbladder EF on 9/17/21 noted with Normal gallbladder ejection fraction of 75%. Lumbar and thoracic Spine Xray on 11/16/21 noted with no definite compression fracture. There was Mild multilevel degenerative disc disease throughout the thoracic and Lumbar spine, Worst at L2-L3 and L5-S1. \par Pt started Physical therapy as conservative tx since Dec 2021 for LBP, Rt Upper Quadrant pain. Pt reported she's not sure therapy is helping and  2 more sessions left. She wants to get acupuncture as she felt it was very effective in the past. She felt b/l knee pain( occasionally sharp on/off, Lt>> Rt) for recent a couple of months. Denied previous knee injury, surgery. No buckling of knee or giving  a way. No stiffness, warmths, swelling. No fever, chills, CP, SOB, leg swelling, n/v/c/d, abdominal pain. Denied weakness or paresthesia of LE.

## 2022-01-27 NOTE — REVIEW OF SYSTEMS
[FreeTextEntry2] : Constitutional:  no fever and no chills. \par Eyes:  no discharge. \par HEENT:  no earache. \par Cardiovascular:  no chest pain, no palpitations and no lower extremity edema. \par Respiratory:  no shortness of breath, no wheezing and no cough. \par Gastrointestinal:  no abdominal pain, no nausea and no vomiting. \par Genitourinary:  no dysuria. \par Musculoskeletal:  no joint pain. \par Integumentary:  no itching. \par Neurological:  no headache. \par Psychiatric:  not suicidal. \par Hematologic/Lymphatic:  no easy bleeding. [FreeTextEntry9] : see HPI

## 2022-05-04 ENCOUNTER — APPOINTMENT (OUTPATIENT)
Dept: INTERNAL MEDICINE | Facility: CLINIC | Age: 77
End: 2022-05-04
Payer: MEDICARE

## 2022-05-04 VITALS
BODY MASS INDEX: 30.23 KG/M2 | HEART RATE: 81 BPM | HEIGHT: 58 IN | DIASTOLIC BLOOD PRESSURE: 70 MMHG | WEIGHT: 144 LBS | OXYGEN SATURATION: 97 % | SYSTOLIC BLOOD PRESSURE: 130 MMHG

## 2022-05-04 DIAGNOSIS — R73.09 OTHER ABNORMAL GLUCOSE: ICD-10-CM

## 2022-05-04 PROCEDURE — G0444 DEPRESSION SCREEN ANNUAL: CPT | Mod: 59

## 2022-05-04 PROCEDURE — G0439: CPT

## 2022-05-05 ENCOUNTER — NON-APPOINTMENT (OUTPATIENT)
Age: 77
End: 2022-05-05

## 2022-06-15 LAB
25(OH)D3 SERPL-MCNC: 41.6 NG/ML
ALBUMIN MFR SERPL ELPH: 55.3 %
ALBUMIN SERPL ELPH-MCNC: 4.7 G/DL
ALBUMIN SERPL-MCNC: 4.4 G/DL
ALBUMIN/GLOB SERPL: 1.2 RATIO
ALP BLD-CCNC: 101 U/L
ALPHA1 GLOB MFR SERPL ELPH: 3.6 %
ALPHA1 GLOB SERPL ELPH-MCNC: 0.3 G/DL
ALPHA2 GLOB MFR SERPL ELPH: 11 %
ALPHA2 GLOB SERPL ELPH-MCNC: 0.9 G/DL
ALT SERPL-CCNC: 32 U/L
ANION GAP SERPL CALC-SCNC: 15 MMOL/L
AST SERPL-CCNC: 23 U/L
B-GLOBULIN MFR SERPL ELPH: 11.4 %
B-GLOBULIN SERPL ELPH-MCNC: 0.9 G/DL
BASOPHILS # BLD AUTO: 0.03 K/UL
BASOPHILS NFR BLD AUTO: 0.5 %
BILIRUB SERPL-MCNC: 0.2 MG/DL
BUN SERPL-MCNC: 15 MG/DL
CALCIUM SERPL-MCNC: 10.2 MG/DL
CHLORIDE SERPL-SCNC: 102 MMOL/L
CHOLEST SERPL-MCNC: 162 MG/DL
CO2 SERPL-SCNC: 23 MMOL/L
CREAT SERPL-MCNC: 0.78 MG/DL
EGFR: 80 ML/MIN/1.73M2
EOSINOPHIL # BLD AUTO: 0.08 K/UL
EOSINOPHIL NFR BLD AUTO: 1.3 %
ESTIMATED AVERAGE GLUCOSE: 126 MG/DL
GAMMA GLOB FLD ELPH-MCNC: 1.5 G/DL
GAMMA GLOB MFR SERPL ELPH: 18.7 %
GLUCOSE SERPL-MCNC: 101 MG/DL
HBA1C MFR BLD HPLC: 6 %
HCT VFR BLD CALC: 44.7 %
HDLC SERPL-MCNC: 43 MG/DL
HGB BLD-MCNC: 14.1 G/DL
IMM GRANULOCYTES NFR BLD AUTO: 0.3 %
INTERPRETATION SERPL IEP-IMP: NORMAL
LDLC SERPL CALC-MCNC: 98 MG/DL
LYMPHOCYTES # BLD AUTO: 1.9 K/UL
LYMPHOCYTES NFR BLD AUTO: 31.2 %
MAN DIFF?: NORMAL
MCHC RBC-ENTMCNC: 28.1 PG
MCHC RBC-ENTMCNC: 31.5 GM/DL
MCV RBC AUTO: 89.2 FL
MONOCYTES # BLD AUTO: 0.36 K/UL
MONOCYTES NFR BLD AUTO: 5.9 %
NEUTROPHILS # BLD AUTO: 3.7 K/UL
NEUTROPHILS NFR BLD AUTO: 60.8 %
NONHDLC SERPL-MCNC: 120 MG/DL
PLATELET # BLD AUTO: 345 K/UL
POTASSIUM SERPL-SCNC: 4.5 MMOL/L
PROT SERPL-MCNC: 8 G/DL
RBC # BLD: 5.01 M/UL
RBC # FLD: 14.4 %
SODIUM SERPL-SCNC: 140 MMOL/L
TRIGL SERPL-MCNC: 108 MG/DL
TSH SERPL-ACNC: 1.6 UIU/ML
WBC # FLD AUTO: 6.09 K/UL

## 2022-06-15 NOTE — HISTORY OF PRESENT ILLNESS
[FreeTextEntry1] : physical [de-identified] : 77 yo female with h/o as below here for CPE.\par Would like to see dermatologist for pimples on face around mouth and papule near left eye.\par Sometimes hears cracking in knees.  Has some knee pain, interested in PT.  \par Needs to see optho, will go.\par No other active issues.

## 2022-06-15 NOTE — ASSESSMENT
[FreeTextEntry1] : 77 yo female with h/o as above including hypothyroidism, pulmonary nodules stable on prior imaging, osteopenia, preDM, sjogren's syndrome, here for CPE.\par 1.  CV - bp at goal, check lipids\par 2.  Endo - check A1c for preDM, TSH on synthroid, had nl thyroid sono few years ago for abnl on exam so will hold on repeat for now, check vitamin D for osteopenia, dexa utd\par 3.  Gyn - no further paps needed for age, mammo utd but rx given when due\par 4.  GI - colonoscopy utd\par 5.  Heme - sjogren's syndrome, check for gammopathy\par 6.  Derm - referred to derm for skin check and facial papules/acne\par 7.  MSK - rx PT given for knee pain, suspect OA\par 8.  HCM - check below labs; consider tdap/shingrix, other vaccines utd\par 9.  RTO prn or 1 year\par \par

## 2022-06-15 NOTE — REVIEW OF SYSTEMS
[Joint Pain] : joint pain [Negative] : ENT [Fever] : no fever [Chills] : no chills [Fatigue] : no fatigue [Recent Change In Weight] : ~T no recent weight change [Chest Pain] : no chest pain [Palpitations] : no palpitations [Shortness Of Breath] : no shortness of breath [Cough] : no cough [Abdominal Pain] : no abdominal pain [Nausea] : no nausea [Constipation] : no constipation [Diarrhea] : diarrhea [Vomiting] : no vomiting [Heartburn] : no heartburn [Melena] : no melena [Dysuria] : no dysuria [Vaginal Discharge] : no vaginal discharge [Skin Rash] : no skin rash [Headache] : no headache [Dizziness] : no dizziness [Fainting] : no fainting [Anxiety] : no anxiety [Depression] : no depression [Easy Bleeding] : no easy bleeding [Easy Bruising] : no easy bruising [FreeTextEntry2] : active with cleaning the house

## 2022-06-15 NOTE — PHYSICAL EXAM
[No Acute Distress] : no acute distress [Well Nourished] : well nourished [Well Developed] : well developed [Well-Appearing] : well-appearing [PERRL] : pupils equal round and reactive to light [EOMI] : extraocular movements intact [Normal Oropharynx] : the oropharynx was normal [Normal TMs] : both tympanic membranes were normal [No Lymphadenopathy] : no lymphadenopathy [Supple] : supple [No Respiratory Distress] : no respiratory distress  [No Accessory Muscle Use] : no accessory muscle use [Clear to Auscultation] : lungs were clear to auscultation bilaterally [Normal Rate] : normal rate  [Regular Rhythm] : with a regular rhythm [Normal S1, S2] : normal S1 and S2 [No Murmur] : no murmur heard [No Carotid Bruits] : no carotid bruits [Pedal Pulses Present] : the pedal pulses are present [No Edema] : there was no peripheral edema [Normal Appearance] : normal in appearance [No Nipple Discharge] : no nipple discharge [No Axillary Lymphadenopathy] : no axillary lymphadenopathy [Soft] : abdomen soft [Non Tender] : non-tender [Non-distended] : non-distended [No Masses] : no abdominal mass palpated [No HSM] : no HSM [Normal Bowel Sounds] : normal bowel sounds [Normal Supraclavicular Nodes] : no supraclavicular lymphadenopathy [Normal Axillary Nodes] : no axillary lymphadenopathy [Normal Posterior Cervical Nodes] : no posterior cervical lymphadenopathy [Normal Anterior Cervical Nodes] : no anterior cervical lymphadenopathy [Normal Inguinal Nodes] : no inguinal lymphadenopathy [No Joint Swelling] : no joint swelling [No Rash] : no rash [Normal Gait] : normal gait [Normal Affect] : the affect was normal [de-identified] : thyroid fullness with ?nodule left thyroid (chronic from prior) [de-identified] : generalized fullness but no masses

## 2022-06-15 NOTE — ADDENDUM
[FreeTextEntry1] : 6/15/22:  Reviewed labs with pt, all nl except LDL 98 minimally elevated with 10 year ASCVD risk 14% to work on diet/exercise (declines statin), A1c 6.0 c/w stable preDM, other labs nl, will see nutritionist and work on exercise.

## 2022-06-15 NOTE — HEALTH RISK ASSESSMENT
[Patient reported mammogram was normal] : Patient reported mammogram was normal [Patient reported bone density results were normal] : Patient reported bone density results were normal [Patient reported colonoscopy was normal] : Patient reported colonoscopy was normal [Fully functional (bathing, dressing, toileting, transferring, walking, feeding)] : Fully functional (bathing, dressing, toileting, transferring, walking, feeding) [Fully functional (using the telephone, shopping, preparing meals, housekeeping, doing laundry, using] : Fully functional and needs no help or supervision to perform IADLs (using the telephone, shopping, preparing meals, housekeeping, doing laundry, using transportation, managing medications and managing finances) [No falls in past year] : Patient reported no falls in the past year [Designated Healthcare Proxy] : Designated healthcare proxy [Relationship: ___] : Relationship: [unfilled] [MammogramDate] : 07/21 [PapSmearComments] : no longer [BoneDensityDate] : 07/21 [BoneDensityComments] : osteopenia [ColonoscopyDate] : 06/21 [ColonoscopyComments] : 5 year f/up visit [AdvancecareDate] : 05/22

## 2022-06-17 ENCOUNTER — EMERGENCY (EMERGENCY)
Facility: HOSPITAL | Age: 77
LOS: 1 days | Discharge: HOME | End: 2022-06-17
Attending: EMERGENCY MEDICINE
Payer: MEDICARE

## 2022-06-17 VITALS
TEMPERATURE: 98 F | WEIGHT: 139.99 LBS | OXYGEN SATURATION: 98 % | DIASTOLIC BLOOD PRESSURE: 95 MMHG | RESPIRATION RATE: 18 BRPM | HEART RATE: 98 BPM | HEIGHT: 59 IN | SYSTOLIC BLOOD PRESSURE: 148 MMHG

## 2022-06-17 DIAGNOSIS — Z98.49 CATARACT EXTRACTION STATUS, UNSPECIFIED EYE: Chronic | ICD-10-CM

## 2022-06-17 PROCEDURE — 99283 EMERGENCY DEPT VISIT LOW MDM: CPT

## 2022-06-17 RX ORDER — OFLOXACIN 0.3 %
2 DROPS OPHTHALMIC (EYE) ONCE
Refills: 0 | Status: COMPLETED | OUTPATIENT
Start: 2022-06-17 | End: 2022-06-17

## 2022-06-17 RX ADMIN — Medication 2 DROP(S): at 20:40

## 2022-06-17 NOTE — ED ADULT NURSE NOTE - OBJECTIVE STATEMENT
76 year old female presented to ED with c/o of left eye pain after flower petal flew into eye while gardening. no bleeding no discharge no blurred vision. pt denies CP, SOB, nausea/vomiting, numbness/tingling, fever, cough, chills, dizziness, headache, blurred vision, neuro intact. pt a&ox3, lung sounds clear, heart rate regular, abdomen soft nontender nondistended to palp. skin intact. pt currently resting in bed comfortably with MD at bedside. Will continue to monitor and assess while offering support and reassurance.

## 2022-06-17 NOTE — ED PROVIDER NOTE - PATIENT PORTAL LINK FT
You can access the FollowMyHealth Patient Portal offered by [facility] by registering at the following website: [email or web address]. By joining [employer]’s FollowMyHealth portal, you will also be able to view your health information using other applications (apps) compatible with our system.

## 2022-06-17 NOTE — ED PROVIDER NOTE - NSFOLLOWUPINSTRUCTIONS_ED_ALL_ED_FT
You were found to have a corneal abrasion of your left eye (a scratch on your cornea).    For this, you may take over the counter Ibuprofen for the pain.    You were given Ofloxacin eye drops. Please apply 2 drops to your LEFT eye every 6 hours for 1 week.    Please follow up with the ophthalmologists. (See info).    Please return to the ED for loss of vision, severe eye pain, double vision, fevers, severe headache, severe swelling of the eye and/or new/concerning symptoms.

## 2022-06-17 NOTE — ED PROVIDER NOTE - NS ED ROS FT
Constitutional: no fevers; no chills  HEENT: eye pain  CV: no cp; no palpitations  Resp: no sob; no cough  : no dysuria, no hematuria  skin: no rashes  neuro: no HA

## 2022-06-17 NOTE — ED PROVIDER NOTE - OBJECTIVE STATEMENT
77 yo F hypothyroid, not on AC, presents for Right eye pain after accidentally getting a flower leaf into her eye while gardening. SHe was cutting them around 3 hours ago when it happened. Initially had blurry vision but states her vision is unchanged from her baseline at this time. Had some mild bleeding from the eye that self resolved. Has mild eye pain. does not wear contact lenses. denies diplopia.

## 2022-06-17 NOTE — ED PROVIDER NOTE - PROGRESS NOTE DETAILS
Ac Hall MD. fluoroscein with tetracaine was used under wood's lamp. Corneal abrasion at 7oclock position. Will order for ofloxacin gtt. Ophtho follow up.

## 2022-06-17 NOTE — ED PROVIDER NOTE - PHYSICAL EXAMINATION
PHYSICAL EXAM:  GENERAL: non-toxic appearing; in no respiratory distress  NECK: trachea midline  EYES: PERRL, EOMs intact b/l w/out deficits; Right eye visual acuity 20/25; left eye visual acuity 20/25; in the right eye, there is a subconjuntival hemorrhage on the inferior and nasal spects; spares the limbus; no hyphema;   CHEST/LUNG: CTAB no wheezes/rhonchi/rales  HEART: RRR no murmur/gallops/rubs  ABDOMEN: soft, NT, ND  NERVOUS SYSTEM:  A&Ox3, Speech is fluent and appropriate  SKIN:  Warm and dry as visualized

## 2022-06-17 NOTE — ED PROVIDER NOTE - CLINICAL SUMMARY MEDICAL DECISION MAKING FREE TEXT BOX
pt with accidental leaf of lower into right eye w/ no changes to her vision but has mild eye pain.  has a subconjunctival hemorrhage sparing limbus. not a contact lens wearer; no AC use. visual acuity 20/25 in left and right eyes. no evidence of globe rupture. will check for corneal abrasions, reassess

## 2022-06-17 NOTE — ED PROVIDER NOTE - ATTENDING CONTRIBUTION TO CARE
75 yo F hypothyroid, not on AC, presents for Right eye pain after accidentally getting a flower leaf into her eye while gardening but no change in vision or foreign body sensation, subungal hematoma noted on exam, to check flourscein uptake, eye exam likely nothing to do.

## 2022-06-17 NOTE — ED PROVIDER NOTE - NSFOLLOWUPCLINICS_GEN_ALL_ED_FT
[facility]  Ophthalmology  [address] [place] [address], [facility] [address]  [place] [address]  Phone: [phone]  Fax:

## 2022-06-21 ENCOUNTER — APPOINTMENT (OUTPATIENT)
Dept: OPHTHALMOLOGY | Facility: CLINIC | Age: 77
End: 2022-06-21
Payer: MEDICARE

## 2022-06-21 ENCOUNTER — NON-APPOINTMENT (OUTPATIENT)
Age: 77
End: 2022-06-21

## 2022-06-21 PROCEDURE — 92250 FUNDUS PHOTOGRAPHY W/I&R: CPT

## 2022-06-21 PROCEDURE — 92014 COMPRE OPH EXAM EST PT 1/>: CPT

## 2022-06-28 ENCOUNTER — APPOINTMENT (OUTPATIENT)
Dept: OPHTHALMOLOGY | Facility: CLINIC | Age: 77
End: 2022-06-28
Payer: MEDICARE

## 2022-06-28 ENCOUNTER — NON-APPOINTMENT (OUTPATIENT)
Age: 77
End: 2022-06-28

## 2022-06-28 PROCEDURE — 68761 CLOSE TEAR DUCT OPENING: CPT | Mod: E2,E4

## 2022-06-28 PROCEDURE — 92012 INTRM OPH EXAM EST PATIENT: CPT | Mod: 25

## 2022-10-04 ENCOUNTER — NON-APPOINTMENT (OUTPATIENT)
Age: 77
End: 2022-10-04

## 2022-10-04 ENCOUNTER — APPOINTMENT (OUTPATIENT)
Dept: OPHTHALMOLOGY | Facility: CLINIC | Age: 77
End: 2022-10-04

## 2022-10-04 PROCEDURE — 92133 CPTRZD OPH DX IMG PST SGM ON: CPT

## 2022-10-04 PROCEDURE — 68761 CLOSE TEAR DUCT OPENING: CPT | Mod: E2,E4

## 2022-10-04 PROCEDURE — 92012 INTRM OPH EXAM EST PATIENT: CPT | Mod: 25

## 2022-10-04 PROCEDURE — 92083 EXTENDED VISUAL FIELD XM: CPT

## 2023-01-04 NOTE — ED ADULT TRIAGE NOTE - PAIN RATING/NUMBER SCALE (0-10): REST
4 Olanzapine Pregnancy And Lactation Text: This medication is pregnancy category C.   There are no adequate and well controlled trials with olanzapine in pregnant females.  Olanzapine should be used during pregnancy only if the potential benefit justifies the potential risk to the fetus.   In a study in lactating healthy women, olanzapine was excreted in breast milk.  It is recommended that women taking olanzapine should not breast feed.

## 2023-03-14 ENCOUNTER — APPOINTMENT (OUTPATIENT)
Dept: RHEUMATOLOGY | Facility: CLINIC | Age: 78
End: 2023-03-14
Payer: MEDICARE

## 2023-03-14 VITALS
BODY MASS INDEX: 30.02 KG/M2 | WEIGHT: 143 LBS | OXYGEN SATURATION: 98 % | SYSTOLIC BLOOD PRESSURE: 127 MMHG | RESPIRATION RATE: 16 BRPM | DIASTOLIC BLOOD PRESSURE: 63 MMHG | TEMPERATURE: 97.1 F | HEIGHT: 58 IN | HEART RATE: 93 BPM

## 2023-03-14 PROCEDURE — 99203 OFFICE O/P NEW LOW 30 MIN: CPT | Mod: GC

## 2023-03-17 LAB
ALBUMIN SERPL ELPH-MCNC: 4.6 G/DL
ALP BLD-CCNC: 97 U/L
ALT SERPL-CCNC: 26 U/L
ANION GAP SERPL CALC-SCNC: 12 MMOL/L
AST SERPL-CCNC: 22 U/L
BASOPHILS # BLD AUTO: 0.03 K/UL
BASOPHILS NFR BLD AUTO: 0.5 %
BILIRUB SERPL-MCNC: 0.2 MG/DL
BUN SERPL-MCNC: 21 MG/DL
C3 SERPL-MCNC: 154 MG/DL
C4 SERPL-MCNC: 30 MG/DL
CALCIUM SERPL-MCNC: 10.2 MG/DL
CHLORIDE SERPL-SCNC: 105 MMOL/L
CO2 SERPL-SCNC: 22 MMOL/L
CREAT SERPL-MCNC: 0.74 MG/DL
DEPRECATED KAPPA LC FREE/LAMBDA SER: 1.63 RATIO
EGFR: 84 ML/MIN/1.73M2
EOSINOPHIL # BLD AUTO: 0.1 K/UL
EOSINOPHIL NFR BLD AUTO: 1.6 %
GLUCOSE SERPL-MCNC: 101 MG/DL
HCT VFR BLD CALC: 40.1 %
HGB BLD-MCNC: 13.2 G/DL
IGA SER QL IEP: 212 MG/DL
IGG SER QL IEP: 1538 MG/DL
IGM SER QL IEP: 53 MG/DL
IMM GRANULOCYTES NFR BLD AUTO: 0.2 %
KAPPA LC CSF-MCNC: 1.88 MG/DL
KAPPA LC SERPL-MCNC: 3.07 MG/DL
LYMPHOCYTES # BLD AUTO: 2.42 K/UL
LYMPHOCYTES NFR BLD AUTO: 38.7 %
MAN DIFF?: NORMAL
MCHC RBC-ENTMCNC: 28.9 PG
MCHC RBC-ENTMCNC: 32.9 GM/DL
MCV RBC AUTO: 87.9 FL
MONOCYTES # BLD AUTO: 0.34 K/UL
MONOCYTES NFR BLD AUTO: 5.4 %
NEUTROPHILS # BLD AUTO: 3.35 K/UL
NEUTROPHILS NFR BLD AUTO: 53.6 %
PLATELET # BLD AUTO: 335 K/UL
POTASSIUM SERPL-SCNC: 4.6 MMOL/L
PROT SERPL-MCNC: 7.9 G/DL
RBC # BLD: 4.56 M/UL
RBC # FLD: 14.5 %
RHEUMATOID FACT SER QL: 23 IU/ML
SODIUM SERPL-SCNC: 140 MMOL/L
WBC # FLD AUTO: 6.25 K/UL

## 2023-03-17 NOTE — PHYSICAL EXAM
[address]  [place] [address]  [phone]  Dept: [phone]      EMTALA TRANSFER CONSENT    NAME Samara Jimenez                                         1977                              MRN [id number]    I have been informed of my rights regarding examination, treatment, and transfer   by Dr Sanna Bunn MD    Benefits: Specialized equipment and/or services available at the receiving facility (Include comment)________________________    Risks: Potential for delay in receiving treatment, Potential deterioration of medical condition, Loss of IV, Increased discomfort during transfer, Possible worsening of condition or death during transfer      Transfer Request   I acknowledge that my medical condition has been evaluated and explained to me by the emergency department physician or other qualified medical person and/or my attending physician who has recommended and offered to me further medical examination and treatment  I understand the Hospital's obligation with respect to the treatment and stabilization of my emergency medical condition  I nevertheless request to be transferred  I release the Hospital, the doctor, and any other persons caring for me from all responsibility or liability for any injury or ill effects that may result from my transfer and agree to accept all responsibility for the consequences of my choice to transfer, rather than receive stabilizing treatment at the Hospital  I understand that because the transfer is my request, my insurance may not provide reimbursement for the services  The Hospital will assist and direct me and my family in how to make arrangements for transfer, but the hospital is not liable for any fees charged by the transport service  In spite of this understanding, I refuse to consent to further medical examination and treatment which has been offered to me, and request transfer to     I authorize the performance of emergency medical procedures and treatments upon me in both transit and upon arrival at the receiving facility  Additionally, I authorize the release of any and all medical records to the receiving facility and request they be transported with me, if possible  I authorize the performance of emergency medical procedures and treatments upon me in both transit and upon arrival at the receiving facility  Additionally, I authorize the release of any and all medical records to the receiving facility and request they be transported with me, if possible  I understand that the safest mode of transportation during a medical emergency is an ambulance and that the Hospital advocates the use of this mode of transport  Risks of traveling to the receiving facility by car, including absence of medical control, life sustaining equipment, such as oxygen, and medical personnel has been explained to me and I fully understand them  (OLI CORRECT BOX BELOW)  [  ]  I consent to the stated transfer and to be transported by ambulance/helicopter  [  ]  I consent to the stated transfer, but refuse transportation by ambulance and accept full responsibility for my transportation by car  I understand the risks of non-ambulance transfers and I exonerate the Hospital and its staff from any deterioration in my condition that results from this refusal     X___________________________________________    DATE  10/03/19  TIME________  Signature of patient or legally responsible individual signing on patient behalf           RELATIONSHIP TO PATIENT_________________________          Provider Certification    NAME Dixon Warner                                         1977                              MRN [id number]    A medical screening exam was performed on the above named patient  Based on the examination:    Condition Necessitating Transfer The primary encounter diagnosis was Extravasation of blood   A diagnosis of Vaginal bleeding was also pertinent to this visit  Patient Condition: The patient has been stabilized such that within reasonable medical probability, no material deterioration of the patient condition or the condition of the unborn child(evangelista) is likely to result from the transfer, An emergency transfer is being made prior to stabilization due to the need for definitive care and the benefit of transfer outweighs the risk    Reason for Transfer: Level of Care needed not available at this facility    Transfer Requirements: Facility     · Space available and qualified personnel available for treatment as acknowledged by    · Agreed to accept transfer and to provide appropriate medical treatment as acknowledged by       Carline Sender  · Appropriate medical records of the examination and treatment of the patient are provided at the time of transfer   [address] [facility], [address] _______  · Transfer will be performed by qualified personnel from    and appropriate transfer equipment as required, including the use of necessary and appropriate life support measures  Provider Certification: I have examined the patient and explained the following risks and benefits of being transferred/refusing transfer to the patient/family:         Based on these reasonable risks and benefits to the patient and/or the unborn child(evangelista), and based upon the information available at the time of the patients examination, I certify that the medical benefits reasonably to be expected from the provision of appropriate medical treatments at another medical facility outweigh the increasing risks, if any, to the individuals medical condition, and in the case of labor to the unborn child, from effecting the transfer      X____________________________________________ DATE 10/03/19        TIME_______      ORIGINAL - SEND TO MEDICAL RECORDS   COPY - SEND WITH PATIENT DURING TRANSFER [General Appearance - In No Acute Distress] : in no acute distress [General Appearance - Alert] : alert [General Appearance - Well-Appearing] : healthy appearing [Sclera] : the sclera and conjunctiva were normal [Respiration, Rhythm And Depth] : normal respiratory rhythm and effort [Auscultation Breath Sounds / Voice Sounds] : lungs were clear to auscultation bilaterally [Heart Rate And Rhythm] : heart rate was normal and rhythm regular [Heart Sounds] : normal S1 and S2 [Edema] : there was no peripheral edema [] : no rash [Oriented To Time, Place, And Person] : oriented to person, place, and time [Impaired Insight] : insight and judgment were intact [Affect] : the affect was normal [FreeTextEntry1] : no swelling/warmt/erythema of the joints of the UE/LE. left knee positive McMurrays with pressure on the lateral side

## 2023-03-17 NOTE — HISTORY OF PRESENT ILLNESS
[Arthralgias] : arthralgias [Dry Eyes] : dry eyes [FreeTextEntry1] : 77 y.o F with PMH of Sjogren's syndrome, hypothyroidism, osteopenia presents for b/l knee pain\par \par Pain in bilateral knees. Pain has been worsening over the past 8-9 moths. Denies trauma to the knee. Patient endorses episode of acute pain in the left knee on Feb 5th due to extreme pain in the left knee. Denies swelling or warmth of the knee at that time. Patient reports going to PT every week for 7 months with no improvement. Did not take any medications for the pain, occasional places topical medications such as Bengay. \par \par Movement down stairs is painful as well as getting up from seated position or bending over. Pain unaffected by walking.  Pain is improved with raising the leg, improved with rest. Denies swelling/warmth/erythema of the joints. Denies AM stiffness\par \par No children. Never pregnant. Denies VTE history/Raynaud's\par \par FHx: Negative for autoimmune disease\par Denies smoking/EtOH/drugs\par Occupation: \par Originally from [place]. Immigrated 1985\par \par Sjogren's Syndrome\par Diagnosed approximately 15 years ago. Denies seeing a Rheumatologist in the past\par Unclear why Sjogren's antibodies were checked. 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 and related blood work encountered the antibodies\par Patient reports having all of her teeth removed with prior history of multiple cavities [Weight Loss] : no weight loss [Fever] : no fever [Chills] : no chills [Malar Facial Rash] : no malar facial rash [Skin Lesions] : no lesions [Skin Nodules] : no skin nodules [Oral Ulcers] : no oral ulcers [Cough] : no cough [Dry Mouth] : no dry mouth [Dysphagia] : no dysphagia [Shortness of Breath] : no shortness of breath [Chest Pain] : no chest pain [Joint Swelling] : no joint swelling [Joint Warmth] : no joint warmth [Joint Deformity] : no joint deformity [Morning Stiffness] : no morning stiffness [Eye Pain] : no eye pain [Eye Redness] : no eye redness

## 2023-03-17 NOTE — REVIEW OF SYSTEMS
[Dry Eyes] : dryness of the eyes [Arthralgias] : arthralgias [Joint Pain] : joint pain [Limb Pain] : limb pain [Negative] : Psychiatric [Fever] : no fever [Chills] : no chills [Recent Weight Loss (___ Lbs)] : no recent weight loss [Eye Pain] : no eye pain [Red Eyes] : eyes not red [Nosebleeds] : no nosebleeds [Nasal Discharge] : no nasal discharge [Joint Swelling] : no joint swelling [Joint Stiffness] : no joint stiffness [Limb Swelling] : no limb swelling [Swollen Glands] : no swollen glands [Swollen Glands In The Neck] : no swollen glands in the neck

## 2023-03-17 NOTE — ASSESSMENT
[FreeTextEntry1] : 77 y.o F with PMH of Sjogren's syndrome, hypothyroidism, osteopenia presents for b/l knee pain likely to be OA\par \par ##Sjogren's Syndrome\par -low risk profile with mostly sicca symptoms\par -given age highly doubt that she will develop a more concerning phenotype with muscle/lung involvement\par -prior CT chests have noted stable pulmonary nodules in lung bases however resolved on XT A/P in 2021\par -not likely to have neurologic/heme/renal involvement\par -will check C3/C4/Igs/RF/myomarker panel\par -otherwise symptomatic management of sicca for now\par \par ##b/l knee pain R>L\par -likely OA. doubt inflammatory etiology\par -will check b/l knee xrays\par -instructed patient to  diclofenac gel OTC and apply 2-3x/day\par -instructed patient to carry out PT exercises at home\par -offered patient steroid injection of the knee at next clinic visit if pain fails to improve with diclofenac and continued PT exercises\par \par ##bone health\par -DEXA 7/2021 with FRAX of 4/9% total and 0.6% hip. repeat 7/2023\par \par case d/w Dr. Long\par \par RTC 3 months\par \par Levar Anderson MD, PGY-4\par Rheumatology Fellow\par SAROJ

## 2023-03-21 ENCOUNTER — APPOINTMENT (OUTPATIENT)
Dept: RADIOLOGY | Facility: CLINIC | Age: 78
End: 2023-03-21
Payer: MEDICARE

## 2023-03-21 ENCOUNTER — OUTPATIENT (OUTPATIENT)
Dept: OUTPATIENT SERVICES | Facility: HOSPITAL | Age: 78
LOS: 1 days | End: 2023-03-21
Payer: MEDICARE

## 2023-03-21 DIAGNOSIS — M25.569 PAIN IN UNSPECIFIED KNEE: ICD-10-CM

## 2023-03-21 DIAGNOSIS — Z98.49 CATARACT EXTRACTION STATUS, UNSPECIFIED EYE: Chronic | ICD-10-CM

## 2023-03-21 PROCEDURE — 73564 X-RAY EXAM KNEE 4 OR MORE: CPT

## 2023-03-21 PROCEDURE — 73564 X-RAY EXAM KNEE 4 OR MORE: CPT | Mod: 26,50

## 2023-03-22 ENCOUNTER — NON-APPOINTMENT (OUTPATIENT)
Age: 78
End: 2023-03-22

## 2023-04-05 ENCOUNTER — NON-APPOINTMENT (OUTPATIENT)
Age: 78
End: 2023-04-05

## 2023-04-08 LAB
EJ AB SER QL: NEGATIVE
ENA JO1 AB SER IA-ACNC: <20 UNITS
ENA PM/SCL AB SER-ACNC: <20 UNITS
ENA SM+RNP AB SER IA-ACNC: <20 UNITS
ENA SS-A IGG SER QL: 185 UNITS
FIBRILLARIN AB SER QL: NEGATIVE
KU AB SER QL: NEGATIVE
MDA-5 (P140)(CADM-140): <20 UNITS
MI2 AB SER QL: NEGATIVE
NXP-2 (P140): <20 UNITS
OJ AB SER QL: NEGATIVE
PL12 AB SER QL: NEGATIVE
PL7 AB SER QL: NEGATIVE
SRP AB SERPL QL: NEGATIVE
TIF GAMMA (P155/140): <20 UNITS
U2 SNRNP AB SER QL: NEGATIVE

## 2023-05-30 ENCOUNTER — NON-APPOINTMENT (OUTPATIENT)
Age: 78
End: 2023-05-30

## 2023-06-07 ENCOUNTER — APPOINTMENT (OUTPATIENT)
Dept: RHEUMATOLOGY | Facility: CLINIC | Age: 78
End: 2023-06-07
Payer: MEDICARE

## 2023-06-07 ENCOUNTER — LABORATORY RESULT (OUTPATIENT)
Age: 78
End: 2023-06-07

## 2023-06-07 VITALS
RESPIRATION RATE: 16 BRPM | BODY MASS INDEX: 30.44 KG/M2 | TEMPERATURE: 97.1 F | HEIGHT: 58 IN | HEART RATE: 80 BPM | SYSTOLIC BLOOD PRESSURE: 129 MMHG | OXYGEN SATURATION: 98 % | WEIGHT: 145 LBS | DIASTOLIC BLOOD PRESSURE: 80 MMHG

## 2023-06-07 PROCEDURE — 99214 OFFICE O/P EST MOD 30 MIN: CPT

## 2023-06-08 ENCOUNTER — NON-APPOINTMENT (OUTPATIENT)
Age: 78
End: 2023-06-08

## 2023-06-08 LAB
ALBUMIN SERPL ELPH-MCNC: 4.5 G/DL
ALP BLD-CCNC: 94 U/L
ALT SERPL-CCNC: 28 U/L
ANION GAP SERPL CALC-SCNC: 13 MMOL/L
APPEARANCE: CLEAR
AST SERPL-CCNC: 26 U/L
BILIRUB SERPL-MCNC: 0.2 MG/DL
BILIRUBIN URINE: NEGATIVE
BLOOD URINE: NEGATIVE
BUN SERPL-MCNC: 14 MG/DL
CALCIUM SERPL-MCNC: 10 MG/DL
CHLORIDE SERPL-SCNC: 105 MMOL/L
CK SERPL-CCNC: 74 U/L
CO2 SERPL-SCNC: 23 MMOL/L
COLOR: YELLOW
CREAT SERPL-MCNC: 0.73 MG/DL
CREAT SPEC-SCNC: 100 MG/DL
CREAT/PROT UR: 0.1 RATIO
CRP SERPL-MCNC: 5 MG/L
DEPRECATED KAPPA LC FREE/LAMBDA SER: 1.79 RATIO
EGFR: 86 ML/MIN/1.73M2
ERYTHROCYTE [SEDIMENTATION RATE] IN BLOOD BY WESTERGREN METHOD: 33 MM/HR
GLUCOSE QUALITATIVE U: NEGATIVE MG/DL
GLUCOSE SERPL-MCNC: 94 MG/DL
IGA SER QL IEP: 218 MG/DL
IGG SER QL IEP: 1548 MG/DL
IGM SER QL IEP: 40 MG/DL
KAPPA LC CSF-MCNC: 1.78 MG/DL
KAPPA LC SERPL-MCNC: 3.19 MG/DL
KETONES URINE: NEGATIVE MG/DL
LEUKOCYTE ESTERASE URINE: ABNORMAL
NITRITE URINE: NEGATIVE
PH URINE: 6
POTASSIUM SERPL-SCNC: 4.5 MMOL/L
PROT SERPL-MCNC: 7.6 G/DL
PROT UR-MCNC: 7 MG/DL
PROTEIN URINE: NEGATIVE MG/DL
RHEUMATOID FACT SER QL: 19 IU/ML
SODIUM SERPL-SCNC: 141 MMOL/L
SPECIFIC GRAVITY URINE: 1.02
UROBILINOGEN URINE: 0.2 MG/DL

## 2023-06-09 LAB
ALBUMIN MFR SERPL ELPH: 54.6 %
ALBUMIN SERPL-MCNC: 4.2 G/DL
ALBUMIN/GLOB SERPL: 1.2 RATIO
ALPHA1 GLOB MFR SERPL ELPH: 3.7 %
ALPHA1 GLOB SERPL ELPH-MCNC: 0.3 G/DL
ALPHA2 GLOB MFR SERPL ELPH: 10.9 %
ALPHA2 GLOB SERPL ELPH-MCNC: 0.8 G/DL
B-GLOBULIN MFR SERPL ELPH: 11.9 %
B-GLOBULIN SERPL ELPH-MCNC: 0.9 G/DL
GAMMA GLOB FLD ELPH-MCNC: 1.5 G/DL
GAMMA GLOB MFR SERPL ELPH: 18.9 %
INTERPRETATION SERPL IEP-IMP: NORMAL
M PROTEIN SPEC IFE-MCNC: NORMAL
PROT SERPL-MCNC: 7.7 G/DL
PROT SERPL-MCNC: 7.7 G/DL

## 2023-06-25 NOTE — PHYSICAL EXAM
[General Appearance - Alert] : alert [General Appearance - In No Acute Distress] : in no acute distress [General Appearance - Well-Appearing] : healthy appearing [Respiration, Rhythm And Depth] : normal respiratory rhythm and effort [Auscultation Breath Sounds / Voice Sounds] : lungs were clear to auscultation bilaterally [Heart Rate And Rhythm] : heart rate was normal and rhythm regular [Heart Sounds] : normal S1 and S2 [Edema] : there was no peripheral edema [] : no rash [Impaired Insight] : insight and judgment were intact [Oriented To Time, Place, And Person] : oriented to person, place, and time [Affect] : the affect was normal [FreeTextEntry1] : no swelling/warmt/erythema of the joints of the UE/LE. left knee positive McMurrays with pressure on the lateral side

## 2023-06-25 NOTE — ASSESSMENT
[FreeTextEntry1] : 77 y.o F with PMH of Sjogren's syndrome, hypothyroidism, osteopenia presents for b/l knee pain likely to be OA\par \par ##Sjogren's Syndrome\par -low risk profile with mostly sicca symptoms\par -given age highly doubt that she will develop a more concerning phenotype with muscle/lung involvement\par -prior CT chests have noted stable pulmonary nodules in lung bases however resolved on XT A/P in 2021\par -not likely to have neurologic/heme/renal involvement\par - Repeat labs today. Referred for PFTs and TTE\par - Symptomatic management of sicca for now\par \par ##b/l knee pain R>L\par -likely OA. doubt inflammatory etiology\par -will check b/l knee xrays\par -instructed patient to  diclofenac gel OTC and apply 2-3x/day\par -instructed patient to carry out PT exercises at home\par -offered patient steroid injection of the knee at next clinic visit if pain fails to improve with diclofenac and continued PT exercises\par \par ##bone health\par -DEXA 7/2021 with FRAX of 4/9% total and 0.6% hip. repeat 7/2023\par \par case d/w Dr. Long\par \par RTC 3 months\par \par Levar Anderson MD, PGY-4\par Rheumatology Fellow\par SAROJ

## 2023-06-25 NOTE — HISTORY OF PRESENT ILLNESS
[___ Month(s) Ago] : [unfilled] month(s) ago [Arthralgias] : arthralgias [Dry Eyes] : dry eyes [FreeTextEntry1] : No new Sjogren's related symptoms. Stable arthralgias \par \par Discussed in detail regarding labs from last visit and their significance.  [Weight Loss] : no weight loss [Fever] : no fever [Chills] : no chills [Malar Facial Rash] : no malar facial rash [Skin Lesions] : no lesions [Skin Nodules] : no skin nodules [Oral Ulcers] : no oral ulcers [Cough] : no cough [Dry Mouth] : no dry mouth [Dysphagia] : no dysphagia [Shortness of Breath] : no shortness of breath [Joint Swelling] : no joint swelling [Chest Pain] : no chest pain [Joint Warmth] : no joint warmth [Joint Deformity] : no joint deformity [Morning Stiffness] : no morning stiffness [Eye Pain] : no eye pain [Eye Redness] : no eye redness

## 2023-08-01 ENCOUNTER — APPOINTMENT (OUTPATIENT)
Dept: OPHTHALMOLOGY | Facility: CLINIC | Age: 78
End: 2023-08-01
Payer: MEDICARE

## 2023-08-01 PROCEDURE — 92133 CPTRZD OPH DX IMG PST SGM ON: CPT

## 2023-08-01 PROCEDURE — 92014 COMPRE OPH EXAM EST PT 1/>: CPT

## 2023-08-08 ENCOUNTER — APPOINTMENT (OUTPATIENT)
Dept: CARDIOLOGY | Facility: CLINIC | Age: 78
End: 2023-08-08
Payer: MEDICARE

## 2023-08-08 PROCEDURE — 93306 TTE W/DOPPLER COMPLETE: CPT

## 2023-09-06 ENCOUNTER — APPOINTMENT (OUTPATIENT)
Dept: RHEUMATOLOGY | Facility: CLINIC | Age: 78
End: 2023-09-06
Payer: MEDICARE

## 2023-09-06 ENCOUNTER — RESULT REVIEW (OUTPATIENT)
Age: 78
End: 2023-09-06

## 2023-09-06 VITALS
DIASTOLIC BLOOD PRESSURE: 87 MMHG | OXYGEN SATURATION: 97 % | WEIGHT: 142 LBS | BODY MASS INDEX: 29.81 KG/M2 | HEIGHT: 58 IN | TEMPERATURE: 98.1 F | RESPIRATION RATE: 16 BRPM | SYSTOLIC BLOOD PRESSURE: 124 MMHG | HEART RATE: 81 BPM

## 2023-09-06 DIAGNOSIS — M25.432 EFFUSION, LEFT WRIST: ICD-10-CM

## 2023-09-06 PROCEDURE — 99214 OFFICE O/P EST MOD 30 MIN: CPT

## 2023-09-07 ENCOUNTER — OUTPATIENT (OUTPATIENT)
Dept: OUTPATIENT SERVICES | Facility: HOSPITAL | Age: 78
LOS: 1 days | End: 2023-09-07
Payer: MEDICARE

## 2023-09-07 ENCOUNTER — APPOINTMENT (OUTPATIENT)
Dept: RADIOLOGY | Facility: CLINIC | Age: 78
End: 2023-09-07
Payer: MEDICARE

## 2023-09-07 DIAGNOSIS — M25.432 EFFUSION, LEFT WRIST: ICD-10-CM

## 2023-09-07 DIAGNOSIS — Z98.49 CATARACT EXTRACTION STATUS, UNSPECIFIED EYE: Chronic | ICD-10-CM

## 2023-09-07 LAB
ALBUPE: 13.3 %
ALPHA1UPE: 22 %
ALPHA2UPE: 23.8 %
BENCE JONES EXCRETION: 0 MG/24HR
BETAUPE: 9.7 %
CREAT 24H UR-MCNC: 0.6 G/24 H
CREATININE UR (MAYO): 38 MG/DL
GAMMAUPE: 31.2 %
IGA 24H UR QL IFE: NORMAL
KAPPA LC 24H UR QL: 0 MG/DL
M PROTEIN 24H MFR UR ELPH: 0 %
PROT ?TM UR-MCNC: 24 HR
PROT PATTERN 24H UR ELPH-IMP: NORMAL
PROT UR-MCNC: 9 MG/DL
PROT UR-MCNC: 9 MG/DL
SPECIMEN VOL 24H UR: 1700 ML
U PROTEIN QNT CALCULATION: 153 MG/24 H

## 2023-09-07 PROCEDURE — 73130 X-RAY EXAM OF HAND: CPT | Mod: 26,LT

## 2023-09-07 PROCEDURE — 73110 X-RAY EXAM OF WRIST: CPT

## 2023-09-07 PROCEDURE — 73090 X-RAY EXAM OF FOREARM: CPT

## 2023-09-07 PROCEDURE — 73090 X-RAY EXAM OF FOREARM: CPT | Mod: 26,LT

## 2023-09-07 PROCEDURE — 73130 X-RAY EXAM OF HAND: CPT

## 2023-09-07 PROCEDURE — 73110 X-RAY EXAM OF WRIST: CPT | Mod: 26,LT

## 2023-09-08 DIAGNOSIS — M11.20 OTHER CHONDROCALCINOSIS, UNSPECIFIED SITE: ICD-10-CM

## 2023-09-08 NOTE — ASSESSMENT
[FreeTextEntry1] : 77 y.o F with PMH of Sjogren's syndrome, hypothyroidism, osteopenia presents for b/l knee pain likely to be OA  1. L wrist swelling: ? calcific tendinitis versus crystalline versus inflammatory arthropathy. Less concerning for septic joint or cellulitis at this time. Will get x-ray. If negative for fracture will get US of the wrist and do a brief course of medrol dose pack.   2. Sjogren's Syndrome -low risk profile with mostly sicca symptoms -given age highly doubt that she will develop a more concerning phenotype with muscle/lung involvement -prior CT chests have noted stable pulmonary nodules in lung bases however resolved on XT A/P in 2021 -not likely to have neurologic/heme/renal involvement - Repeat labs today. Referred for PFTs and TTE - Symptomatic management of sicca for now  3. b/l knee pain R>L: -likely OA. doubt inflammatory etiology -will check b/l knee xrays -instructed patient to  diclofenac gel OTC and apply 2-3x/day -instructed patient to carry out PT exercises at home -offered patient steroid injection of the knee at next clinic visit if pain fails to improve with diclofenac and continued PT exercises  4. Bone health: -DEXA 7/2021 with FRAX of 4/9% total and 0.6% hip. repeat 7/2023  RTC 3 months

## 2023-09-08 NOTE — PHYSICAL EXAM
[General Appearance - Alert] : alert [General Appearance - In No Acute Distress] : in no acute distress [General Appearance - Well-Appearing] : healthy appearing [Respiration, Rhythm And Depth] : normal respiratory rhythm and effort [Auscultation Breath Sounds / Voice Sounds] : lungs were clear to auscultation bilaterally [Heart Rate And Rhythm] : heart rate was normal and rhythm regular [Heart Sounds] : normal S1 and S2 [Edema] : there was no peripheral edema [] : no rash [Oriented To Time, Place, And Person] : oriented to person, place, and time [Impaired Insight] : insight and judgment were intact [Affect] : the affect was normal [FreeTextEntry1] : Swelling and warmth over the L wrist along the radial aspect extending up to the CMC joint of the thumb and the distal 1/3rd of the forearm. Pain with light palpation.

## 2023-09-08 NOTE — HISTORY OF PRESENT ILLNESS
[___ Month(s) Ago] : [unfilled] month(s) ago [Arthralgias] : arthralgias [Dry Eyes] : dry eyes [FreeTextEntry1] : She woke up Monday she noticed swelling and pain in the L wrist radiating into the thumb and forearm. Difficulty using the hand and pain with minimal ROM of the wrist. Feels that it has gotten worse over the last 24 hours. No cuts, bruises, falls. No trauma. No h/o gout.  [Weight Loss] : no weight loss [Fever] : no fever [Chills] : no chills [Malar Facial Rash] : no malar facial rash [Skin Lesions] : no lesions [Skin Nodules] : no skin nodules [Oral Ulcers] : no oral ulcers [Cough] : no cough [Dry Mouth] : no dry mouth [Dysphagia] : no dysphagia [Shortness of Breath] : no shortness of breath [Chest Pain] : no chest pain [Joint Swelling] : no joint swelling [Joint Warmth] : no joint warmth [Joint Deformity] : no joint deformity [Morning Stiffness] : no morning stiffness [Eye Pain] : no eye pain [Eye Redness] : no eye redness

## 2023-09-13 ENCOUNTER — APPOINTMENT (OUTPATIENT)
Dept: ULTRASOUND IMAGING | Facility: CLINIC | Age: 78
End: 2023-09-13
Payer: MEDICARE

## 2023-09-13 ENCOUNTER — OUTPATIENT (OUTPATIENT)
Dept: OUTPATIENT SERVICES | Facility: HOSPITAL | Age: 78
LOS: 1 days | End: 2023-09-13
Payer: MEDICARE

## 2023-09-13 DIAGNOSIS — M25.432 EFFUSION, LEFT WRIST: ICD-10-CM

## 2023-09-13 DIAGNOSIS — Z98.49 CATARACT EXTRACTION STATUS, UNSPECIFIED EYE: Chronic | ICD-10-CM

## 2023-09-13 DIAGNOSIS — Z00.8 ENCOUNTER FOR OTHER GENERAL EXAMINATION: ICD-10-CM

## 2023-09-13 PROCEDURE — 76881 US COMPL JOINT R-T W/IMG: CPT | Mod: 26,LT

## 2023-09-13 PROCEDURE — 76881 US COMPL JOINT R-T W/IMG: CPT

## 2023-10-25 ENCOUNTER — LABORATORY RESULT (OUTPATIENT)
Age: 78
End: 2023-10-25

## 2023-10-25 ENCOUNTER — APPOINTMENT (OUTPATIENT)
Dept: RHEUMATOLOGY | Facility: CLINIC | Age: 78
End: 2023-10-25
Payer: MEDICARE

## 2023-10-25 VITALS
BODY MASS INDEX: 30.44 KG/M2 | DIASTOLIC BLOOD PRESSURE: 84 MMHG | SYSTOLIC BLOOD PRESSURE: 126 MMHG | HEART RATE: 91 BPM | OXYGEN SATURATION: 98 % | WEIGHT: 145 LBS | HEIGHT: 58 IN

## 2023-10-25 PROCEDURE — 99214 OFFICE O/P EST MOD 30 MIN: CPT

## 2023-10-27 LAB
ALBUMIN SERPL ELPH-MCNC: 4.5 G/DL
ALP BLD-CCNC: 101 U/L
ALT SERPL-CCNC: 29 U/L
ANION GAP SERPL CALC-SCNC: 13 MMOL/L
APPEARANCE: CLEAR
AST SERPL-CCNC: 22 U/L
BASOPHILS # BLD AUTO: 0.04 K/UL
BASOPHILS NFR BLD AUTO: 0.7 %
BILIRUB SERPL-MCNC: 0.2 MG/DL
BILIRUBIN URINE: NEGATIVE
BLOOD URINE: NEGATIVE
BUN SERPL-MCNC: 14 MG/DL
C3 SERPL-MCNC: 165 MG/DL
C4 SERPL-MCNC: 29 MG/DL
CALCIUM SERPL-MCNC: 9.7 MG/DL
CHLORIDE SERPL-SCNC: 104 MMOL/L
CK SERPL-CCNC: 62 U/L
CO2 SERPL-SCNC: 22 MMOL/L
COLOR: YELLOW
CREAT SERPL-MCNC: 0.7 MG/DL
CREAT SPEC-SCNC: 25 MG/DL
CREAT/PROT UR: NORMAL RATIO
CRP SERPL-MCNC: 5 MG/L
DEPRECATED KAPPA LC FREE/LAMBDA SER: 1.77 RATIO
DSDNA AB SER-ACNC: <12 IU/ML
EGFR: 90 ML/MIN/1.73M2
EOSINOPHIL # BLD AUTO: 0.07 K/UL
EOSINOPHIL NFR BLD AUTO: 1.3 %
ERYTHROCYTE [SEDIMENTATION RATE] IN BLOOD BY WESTERGREN METHOD: 17 MM/HR
GLUCOSE QUALITATIVE U: NEGATIVE MG/DL
GLUCOSE SERPL-MCNC: 97 MG/DL
HCT VFR BLD CALC: 39.4 %
HGB BLD-MCNC: 13.1 G/DL
IGA SER QL IEP: 216 MG/DL
IGG SER QL IEP: 1301 MG/DL
IGM SER QL IEP: 47 MG/DL
IMM GRANULOCYTES NFR BLD AUTO: 0.4 %
KAPPA LC CSF-MCNC: 1.43 MG/DL
KAPPA LC SERPL-MCNC: 2.53 MG/DL
KETONES URINE: NEGATIVE MG/DL
LEUKOCYTE ESTERASE URINE: ABNORMAL
LYMPHOCYTES # BLD AUTO: 2.11 K/UL
LYMPHOCYTES NFR BLD AUTO: 38.5 %
MAN DIFF?: NORMAL
MCHC RBC-ENTMCNC: 29.2 PG
MCHC RBC-ENTMCNC: 33.2 GM/DL
MCV RBC AUTO: 87.8 FL
MONOCYTES # BLD AUTO: 0.33 K/UL
MONOCYTES NFR BLD AUTO: 6 %
NEUTROPHILS # BLD AUTO: 2.91 K/UL
NEUTROPHILS NFR BLD AUTO: 53.1 %
NITRITE URINE: NEGATIVE
PH URINE: 6
PLATELET # BLD AUTO: 320 K/UL
POTASSIUM SERPL-SCNC: 4.1 MMOL/L
PROT SERPL-MCNC: 7.4 G/DL
PROT UR-MCNC: <4 MG/DL
PROTEIN URINE: NEGATIVE MG/DL
RBC # BLD: 4.49 M/UL
RBC # FLD: 14.4 %
SODIUM SERPL-SCNC: 139 MMOL/L
SPECIFIC GRAVITY URINE: 1.01
UROBILINOGEN URINE: 0.2 MG/DL
WBC # FLD AUTO: 5.48 K/UL

## 2023-11-16 ENCOUNTER — APPOINTMENT (OUTPATIENT)
Dept: INTERNAL MEDICINE | Facility: CLINIC | Age: 78
End: 2023-11-16
Payer: MEDICARE

## 2023-11-16 VITALS
SYSTOLIC BLOOD PRESSURE: 136 MMHG | WEIGHT: 145 LBS | HEART RATE: 85 BPM | DIASTOLIC BLOOD PRESSURE: 80 MMHG | HEIGHT: 58 IN | BODY MASS INDEX: 30.44 KG/M2 | OXYGEN SATURATION: 97 %

## 2023-11-16 DIAGNOSIS — Z00.00 ENCOUNTER FOR GENERAL ADULT MEDICAL EXAMINATION W/OUT ABNORMAL FINDINGS: ICD-10-CM

## 2023-11-16 DIAGNOSIS — Z12.11 ENCOUNTER FOR SCREENING FOR MALIGNANT NEOPLASM OF COLON: ICD-10-CM

## 2023-11-16 DIAGNOSIS — Z12.39 ENCOUNTER FOR OTHER SCREENING FOR MALIGNANT NEOPLASM OF BREAST: ICD-10-CM

## 2023-11-16 DIAGNOSIS — M85.80 OTHER SPECIFIED DISORDERS OF BONE DENSITY AND STRUCTURE, UNSPECIFIED SITE: ICD-10-CM

## 2023-11-16 DIAGNOSIS — E03.9 HYPOTHYROIDISM, UNSPECIFIED: ICD-10-CM

## 2023-11-16 DIAGNOSIS — M35.00 SICCA SYNDROME, UNSPECIFIED: ICD-10-CM

## 2023-11-16 DIAGNOSIS — Z23 ENCOUNTER FOR IMMUNIZATION: ICD-10-CM

## 2023-11-16 PROCEDURE — G0444 DEPRESSION SCREEN ANNUAL: CPT

## 2023-11-16 PROCEDURE — G0439: CPT

## 2023-11-16 PROCEDURE — G0442 ANNUAL ALCOHOL SCREEN 15 MIN: CPT

## 2023-11-16 RX ORDER — ZOSTER VACCINE RECOMBINANT, ADJUVANTED 50 MCG/0.5
50 KIT INTRAMUSCULAR
Qty: 1 | Refills: 1 | Status: ACTIVE | COMMUNITY
Start: 2023-11-16 | End: 1900-01-01

## 2023-11-19 LAB
25(OH)D3 SERPL-MCNC: 36.7 NG/ML
ALBUMIN SERPL ELPH-MCNC: 4.4 G/DL
ALP BLD-CCNC: 98 U/L
ALT SERPL-CCNC: 46 U/L
ANION GAP SERPL CALC-SCNC: 11 MMOL/L
AST SERPL-CCNC: 26 U/L
BASOPHILS # BLD AUTO: 0.03 K/UL
BASOPHILS NFR BLD AUTO: 0.5 %
BILIRUB SERPL-MCNC: 0.3 MG/DL
BUN SERPL-MCNC: 18 MG/DL
CALCIUM SERPL-MCNC: 9.7 MG/DL
CHLORIDE SERPL-SCNC: 104 MMOL/L
CHOLEST SERPL-MCNC: 162 MG/DL
CO2 SERPL-SCNC: 24 MMOL/L
CREAT SERPL-MCNC: 0.72 MG/DL
EGFR: 87 ML/MIN/1.73M2
EOSINOPHIL # BLD AUTO: 0.14 K/UL
EOSINOPHIL NFR BLD AUTO: 2.3 %
ESTIMATED AVERAGE GLUCOSE: 123 MG/DL
GLUCOSE SERPL-MCNC: 94 MG/DL
HBA1C MFR BLD HPLC: 5.9 %
HCT VFR BLD CALC: 40.5 %
HDLC SERPL-MCNC: 36 MG/DL
HGB BLD-MCNC: 13.3 G/DL
IMM GRANULOCYTES NFR BLD AUTO: 0.5 %
LDLC SERPL CALC-MCNC: 100 MG/DL
LYMPHOCYTES # BLD AUTO: 1.98 K/UL
LYMPHOCYTES NFR BLD AUTO: 33.2 %
MAN DIFF?: NORMAL
MCHC RBC-ENTMCNC: 29.4 PG
MCHC RBC-ENTMCNC: 32.8 GM/DL
MCV RBC AUTO: 89.6 FL
MONOCYTES # BLD AUTO: 0.38 K/UL
MONOCYTES NFR BLD AUTO: 6.4 %
NEUTROPHILS # BLD AUTO: 3.4 K/UL
NEUTROPHILS NFR BLD AUTO: 57.1 %
NONHDLC SERPL-MCNC: 126 MG/DL
PLATELET # BLD AUTO: 346 K/UL
POTASSIUM SERPL-SCNC: 4.1 MMOL/L
PROT SERPL-MCNC: 7.5 G/DL
RBC # BLD: 4.52 M/UL
RBC # FLD: 14.5 %
SODIUM SERPL-SCNC: 139 MMOL/L
T4 FREE SERPL-MCNC: 1.7 NG/DL
TRIGL SERPL-MCNC: 143 MG/DL
TSH SERPL-ACNC: 1.98 UIU/ML
WBC # FLD AUTO: 5.96 K/UL

## 2023-11-19 RX ORDER — METHYLPREDNISOLONE 4 MG/1
4 TABLET ORAL
Qty: 1 | Refills: 0 | Status: DISCONTINUED | COMMUNITY
Start: 2023-09-08 | End: 2023-11-19

## 2023-12-16 ENCOUNTER — EMERGENCY (EMERGENCY)
Facility: HOSPITAL | Age: 78
LOS: 1 days | Discharge: HOME | End: 2023-12-16
Attending: EMERGENCY MEDICINE
Payer: MEDICARE

## 2023-12-16 VITALS
WEIGHT: 149.91 LBS | SYSTOLIC BLOOD PRESSURE: 144 MMHG | HEIGHT: 55 IN | HEART RATE: 83 BPM | TEMPERATURE: 99 F | OXYGEN SATURATION: 99 % | DIASTOLIC BLOOD PRESSURE: 85 MMHG | RESPIRATION RATE: 20 BRPM

## 2023-12-16 VITALS
HEART RATE: 75 BPM | TEMPERATURE: 98 F | DIASTOLIC BLOOD PRESSURE: 75 MMHG | OXYGEN SATURATION: 97 % | SYSTOLIC BLOOD PRESSURE: 126 MMHG | RESPIRATION RATE: 18 BRPM

## 2023-12-16 DIAGNOSIS — Z98.49 CATARACT EXTRACTION STATUS, UNSPECIFIED EYE: Chronic | ICD-10-CM

## 2023-12-16 LAB
ALBUMIN SERPL ELPH-MCNC: 4.6 G/DL — SIGNIFICANT CHANGE UP (ref 3.3–5)
ALBUMIN SERPL ELPH-MCNC: 4.6 G/DL — SIGNIFICANT CHANGE UP (ref 3.3–5)
ALP SERPL-CCNC: 86 U/L — SIGNIFICANT CHANGE UP (ref 40–120)
ALP SERPL-CCNC: 86 U/L — SIGNIFICANT CHANGE UP (ref 40–120)
ALT FLD-CCNC: 30 U/L — SIGNIFICANT CHANGE UP (ref 10–45)
ALT FLD-CCNC: 30 U/L — SIGNIFICANT CHANGE UP (ref 10–45)
ANION GAP SERPL CALC-SCNC: 10 MMOL/L — SIGNIFICANT CHANGE UP (ref 5–17)
ANION GAP SERPL CALC-SCNC: 10 MMOL/L — SIGNIFICANT CHANGE UP (ref 5–17)
APPEARANCE UR: CLEAR — SIGNIFICANT CHANGE UP
APPEARANCE UR: CLEAR — SIGNIFICANT CHANGE UP
AST SERPL-CCNC: 25 U/L — SIGNIFICANT CHANGE UP (ref 10–40)
AST SERPL-CCNC: 25 U/L — SIGNIFICANT CHANGE UP (ref 10–40)
BACTERIA # UR AUTO: NEGATIVE /HPF — SIGNIFICANT CHANGE UP
BACTERIA # UR AUTO: NEGATIVE /HPF — SIGNIFICANT CHANGE UP
BASE EXCESS BLDV CALC-SCNC: 2.1 MMOL/L — SIGNIFICANT CHANGE UP (ref -2–3)
BASE EXCESS BLDV CALC-SCNC: 2.1 MMOL/L — SIGNIFICANT CHANGE UP (ref -2–3)
BASOPHILS # BLD AUTO: 0.02 K/UL — SIGNIFICANT CHANGE UP (ref 0–0.2)
BASOPHILS # BLD AUTO: 0.02 K/UL — SIGNIFICANT CHANGE UP (ref 0–0.2)
BASOPHILS NFR BLD AUTO: 0.3 % — SIGNIFICANT CHANGE UP (ref 0–2)
BASOPHILS NFR BLD AUTO: 0.3 % — SIGNIFICANT CHANGE UP (ref 0–2)
BILIRUB SERPL-MCNC: 0.3 MG/DL — SIGNIFICANT CHANGE UP (ref 0.2–1.2)
BILIRUB SERPL-MCNC: 0.3 MG/DL — SIGNIFICANT CHANGE UP (ref 0.2–1.2)
BILIRUB UR-MCNC: NEGATIVE — SIGNIFICANT CHANGE UP
BILIRUB UR-MCNC: NEGATIVE — SIGNIFICANT CHANGE UP
BUN SERPL-MCNC: 18 MG/DL — SIGNIFICANT CHANGE UP (ref 7–23)
BUN SERPL-MCNC: 18 MG/DL — SIGNIFICANT CHANGE UP (ref 7–23)
CA-I SERPL-SCNC: 1.25 MMOL/L — SIGNIFICANT CHANGE UP (ref 1.15–1.33)
CA-I SERPL-SCNC: 1.25 MMOL/L — SIGNIFICANT CHANGE UP (ref 1.15–1.33)
CALCIUM SERPL-MCNC: 9.6 MG/DL — SIGNIFICANT CHANGE UP (ref 8.4–10.5)
CALCIUM SERPL-MCNC: 9.6 MG/DL — SIGNIFICANT CHANGE UP (ref 8.4–10.5)
CAST: 0 /LPF — SIGNIFICANT CHANGE UP (ref 0–4)
CAST: 0 /LPF — SIGNIFICANT CHANGE UP (ref 0–4)
CHLORIDE BLDV-SCNC: 105 MMOL/L — SIGNIFICANT CHANGE UP (ref 96–108)
CHLORIDE BLDV-SCNC: 105 MMOL/L — SIGNIFICANT CHANGE UP (ref 96–108)
CHLORIDE SERPL-SCNC: 104 MMOL/L — SIGNIFICANT CHANGE UP (ref 96–108)
CHLORIDE SERPL-SCNC: 104 MMOL/L — SIGNIFICANT CHANGE UP (ref 96–108)
CO2 BLDV-SCNC: 31 MMOL/L — HIGH (ref 22–26)
CO2 BLDV-SCNC: 31 MMOL/L — HIGH (ref 22–26)
CO2 SERPL-SCNC: 26 MMOL/L — SIGNIFICANT CHANGE UP (ref 22–31)
CO2 SERPL-SCNC: 26 MMOL/L — SIGNIFICANT CHANGE UP (ref 22–31)
COLOR SPEC: YELLOW — SIGNIFICANT CHANGE UP
COLOR SPEC: YELLOW — SIGNIFICANT CHANGE UP
CREAT SERPL-MCNC: 0.7 MG/DL — SIGNIFICANT CHANGE UP (ref 0.5–1.3)
CREAT SERPL-MCNC: 0.7 MG/DL — SIGNIFICANT CHANGE UP (ref 0.5–1.3)
DIFF PNL FLD: NEGATIVE — SIGNIFICANT CHANGE UP
DIFF PNL FLD: NEGATIVE — SIGNIFICANT CHANGE UP
EGFR: 90 ML/MIN/1.73M2 — SIGNIFICANT CHANGE UP
EGFR: 90 ML/MIN/1.73M2 — SIGNIFICANT CHANGE UP
EOSINOPHIL # BLD AUTO: 0.15 K/UL — SIGNIFICANT CHANGE UP (ref 0–0.5)
EOSINOPHIL # BLD AUTO: 0.15 K/UL — SIGNIFICANT CHANGE UP (ref 0–0.5)
EOSINOPHIL NFR BLD AUTO: 2.3 % — SIGNIFICANT CHANGE UP (ref 0–6)
EOSINOPHIL NFR BLD AUTO: 2.3 % — SIGNIFICANT CHANGE UP (ref 0–6)
GAS PNL BLDV: 134 MMOL/L — LOW (ref 136–145)
GAS PNL BLDV: 134 MMOL/L — LOW (ref 136–145)
GAS PNL BLDV: SIGNIFICANT CHANGE UP
GAS PNL BLDV: SIGNIFICANT CHANGE UP
GLUCOSE BLDV-MCNC: 102 MG/DL — HIGH (ref 70–99)
GLUCOSE BLDV-MCNC: 102 MG/DL — HIGH (ref 70–99)
GLUCOSE SERPL-MCNC: 98 MG/DL — SIGNIFICANT CHANGE UP (ref 70–99)
GLUCOSE SERPL-MCNC: 98 MG/DL — SIGNIFICANT CHANGE UP (ref 70–99)
GLUCOSE UR QL: NEGATIVE MG/DL — SIGNIFICANT CHANGE UP
GLUCOSE UR QL: NEGATIVE MG/DL — SIGNIFICANT CHANGE UP
HCO3 BLDV-SCNC: 29 MMOL/L — SIGNIFICANT CHANGE UP (ref 22–29)
HCO3 BLDV-SCNC: 29 MMOL/L — SIGNIFICANT CHANGE UP (ref 22–29)
HCT VFR BLD CALC: 40.5 % — SIGNIFICANT CHANGE UP (ref 34.5–45)
HCT VFR BLD CALC: 40.5 % — SIGNIFICANT CHANGE UP (ref 34.5–45)
HCT VFR BLDA CALC: 43 % — SIGNIFICANT CHANGE UP (ref 34.5–46.5)
HCT VFR BLDA CALC: 43 % — SIGNIFICANT CHANGE UP (ref 34.5–46.5)
HGB BLD CALC-MCNC: 14.2 G/DL — SIGNIFICANT CHANGE UP (ref 11.7–16.1)
HGB BLD CALC-MCNC: 14.2 G/DL — SIGNIFICANT CHANGE UP (ref 11.7–16.1)
HGB BLD-MCNC: 13.6 G/DL — SIGNIFICANT CHANGE UP (ref 11.5–15.5)
HGB BLD-MCNC: 13.6 G/DL — SIGNIFICANT CHANGE UP (ref 11.5–15.5)
IMM GRANULOCYTES NFR BLD AUTO: 0.5 % — SIGNIFICANT CHANGE UP (ref 0–0.9)
IMM GRANULOCYTES NFR BLD AUTO: 0.5 % — SIGNIFICANT CHANGE UP (ref 0–0.9)
KETONES UR-MCNC: NEGATIVE MG/DL — SIGNIFICANT CHANGE UP
KETONES UR-MCNC: NEGATIVE MG/DL — SIGNIFICANT CHANGE UP
LACTATE BLDV-MCNC: 1 MMOL/L — SIGNIFICANT CHANGE UP (ref 0.5–2)
LACTATE BLDV-MCNC: 1 MMOL/L — SIGNIFICANT CHANGE UP (ref 0.5–2)
LEUKOCYTE ESTERASE UR-ACNC: NEGATIVE — SIGNIFICANT CHANGE UP
LEUKOCYTE ESTERASE UR-ACNC: NEGATIVE — SIGNIFICANT CHANGE UP
LIDOCAIN IGE QN: 44 U/L — SIGNIFICANT CHANGE UP (ref 7–60)
LIDOCAIN IGE QN: 44 U/L — SIGNIFICANT CHANGE UP (ref 7–60)
LYMPHOCYTES # BLD AUTO: 2.02 K/UL — SIGNIFICANT CHANGE UP (ref 1–3.3)
LYMPHOCYTES # BLD AUTO: 2.02 K/UL — SIGNIFICANT CHANGE UP (ref 1–3.3)
LYMPHOCYTES # BLD AUTO: 30.4 % — SIGNIFICANT CHANGE UP (ref 13–44)
LYMPHOCYTES # BLD AUTO: 30.4 % — SIGNIFICANT CHANGE UP (ref 13–44)
MCHC RBC-ENTMCNC: 29.2 PG — SIGNIFICANT CHANGE UP (ref 27–34)
MCHC RBC-ENTMCNC: 29.2 PG — SIGNIFICANT CHANGE UP (ref 27–34)
MCHC RBC-ENTMCNC: 33.6 GM/DL — SIGNIFICANT CHANGE UP (ref 32–36)
MCHC RBC-ENTMCNC: 33.6 GM/DL — SIGNIFICANT CHANGE UP (ref 32–36)
MCV RBC AUTO: 87.1 FL — SIGNIFICANT CHANGE UP (ref 80–100)
MCV RBC AUTO: 87.1 FL — SIGNIFICANT CHANGE UP (ref 80–100)
MONOCYTES # BLD AUTO: 0.4 K/UL — SIGNIFICANT CHANGE UP (ref 0–0.9)
MONOCYTES # BLD AUTO: 0.4 K/UL — SIGNIFICANT CHANGE UP (ref 0–0.9)
MONOCYTES NFR BLD AUTO: 6 % — SIGNIFICANT CHANGE UP (ref 2–14)
MONOCYTES NFR BLD AUTO: 6 % — SIGNIFICANT CHANGE UP (ref 2–14)
NEUTROPHILS # BLD AUTO: 4.03 K/UL — SIGNIFICANT CHANGE UP (ref 1.8–7.4)
NEUTROPHILS # BLD AUTO: 4.03 K/UL — SIGNIFICANT CHANGE UP (ref 1.8–7.4)
NEUTROPHILS NFR BLD AUTO: 60.5 % — SIGNIFICANT CHANGE UP (ref 43–77)
NEUTROPHILS NFR BLD AUTO: 60.5 % — SIGNIFICANT CHANGE UP (ref 43–77)
NITRITE UR-MCNC: NEGATIVE — SIGNIFICANT CHANGE UP
NITRITE UR-MCNC: NEGATIVE — SIGNIFICANT CHANGE UP
NRBC # BLD: 0 /100 WBCS — SIGNIFICANT CHANGE UP (ref 0–0)
NRBC # BLD: 0 /100 WBCS — SIGNIFICANT CHANGE UP (ref 0–0)
PCO2 BLDV: 54 MMHG — HIGH (ref 39–42)
PCO2 BLDV: 54 MMHG — HIGH (ref 39–42)
PH BLDV: 7.34 — SIGNIFICANT CHANGE UP (ref 7.32–7.43)
PH BLDV: 7.34 — SIGNIFICANT CHANGE UP (ref 7.32–7.43)
PH UR: 6.5 — SIGNIFICANT CHANGE UP (ref 5–8)
PH UR: 6.5 — SIGNIFICANT CHANGE UP (ref 5–8)
PLATELET # BLD AUTO: 325 K/UL — SIGNIFICANT CHANGE UP (ref 150–400)
PLATELET # BLD AUTO: 325 K/UL — SIGNIFICANT CHANGE UP (ref 150–400)
PO2 BLDV: 27 MMHG — SIGNIFICANT CHANGE UP (ref 25–45)
PO2 BLDV: 27 MMHG — SIGNIFICANT CHANGE UP (ref 25–45)
POTASSIUM BLDV-SCNC: 4.3 MMOL/L — SIGNIFICANT CHANGE UP (ref 3.5–5.1)
POTASSIUM BLDV-SCNC: 4.3 MMOL/L — SIGNIFICANT CHANGE UP (ref 3.5–5.1)
POTASSIUM SERPL-MCNC: 4.5 MMOL/L — SIGNIFICANT CHANGE UP (ref 3.5–5.3)
POTASSIUM SERPL-MCNC: 4.5 MMOL/L — SIGNIFICANT CHANGE UP (ref 3.5–5.3)
POTASSIUM SERPL-SCNC: 4.5 MMOL/L — SIGNIFICANT CHANGE UP (ref 3.5–5.3)
POTASSIUM SERPL-SCNC: 4.5 MMOL/L — SIGNIFICANT CHANGE UP (ref 3.5–5.3)
PROT SERPL-MCNC: 8.1 G/DL — SIGNIFICANT CHANGE UP (ref 6–8.3)
PROT SERPL-MCNC: 8.1 G/DL — SIGNIFICANT CHANGE UP (ref 6–8.3)
PROT UR-MCNC: NEGATIVE MG/DL — SIGNIFICANT CHANGE UP
PROT UR-MCNC: NEGATIVE MG/DL — SIGNIFICANT CHANGE UP
RBC # BLD: 4.65 M/UL — SIGNIFICANT CHANGE UP (ref 3.8–5.2)
RBC # BLD: 4.65 M/UL — SIGNIFICANT CHANGE UP (ref 3.8–5.2)
RBC # FLD: 13.9 % — SIGNIFICANT CHANGE UP (ref 10.3–14.5)
RBC # FLD: 13.9 % — SIGNIFICANT CHANGE UP (ref 10.3–14.5)
RBC CASTS # UR COMP ASSIST: 0 /HPF — SIGNIFICANT CHANGE UP (ref 0–4)
RBC CASTS # UR COMP ASSIST: 0 /HPF — SIGNIFICANT CHANGE UP (ref 0–4)
SAO2 % BLDV: 29.4 % — LOW (ref 67–88)
SAO2 % BLDV: 29.4 % — LOW (ref 67–88)
SODIUM SERPL-SCNC: 140 MMOL/L — SIGNIFICANT CHANGE UP (ref 135–145)
SODIUM SERPL-SCNC: 140 MMOL/L — SIGNIFICANT CHANGE UP (ref 135–145)
SP GR SPEC: 1.01 — SIGNIFICANT CHANGE UP (ref 1–1.03)
SP GR SPEC: 1.01 — SIGNIFICANT CHANGE UP (ref 1–1.03)
SQUAMOUS # UR AUTO: 0 /HPF — SIGNIFICANT CHANGE UP (ref 0–5)
SQUAMOUS # UR AUTO: 0 /HPF — SIGNIFICANT CHANGE UP (ref 0–5)
UROBILINOGEN FLD QL: 0.2 MG/DL — SIGNIFICANT CHANGE UP (ref 0.2–1)
UROBILINOGEN FLD QL: 0.2 MG/DL — SIGNIFICANT CHANGE UP (ref 0.2–1)
WBC # BLD: 6.65 K/UL — SIGNIFICANT CHANGE UP (ref 3.8–10.5)
WBC # BLD: 6.65 K/UL — SIGNIFICANT CHANGE UP (ref 3.8–10.5)
WBC # FLD AUTO: 6.65 K/UL — SIGNIFICANT CHANGE UP (ref 3.8–10.5)
WBC # FLD AUTO: 6.65 K/UL — SIGNIFICANT CHANGE UP (ref 3.8–10.5)
WBC UR QL: 0 /HPF — SIGNIFICANT CHANGE UP (ref 0–5)
WBC UR QL: 0 /HPF — SIGNIFICANT CHANGE UP (ref 0–5)

## 2023-12-16 PROCEDURE — 84295 ASSAY OF SERUM SODIUM: CPT

## 2023-12-16 PROCEDURE — 82803 BLOOD GASES ANY COMBINATION: CPT

## 2023-12-16 PROCEDURE — 85018 HEMOGLOBIN: CPT

## 2023-12-16 PROCEDURE — 74177 CT ABD & PELVIS W/CONTRAST: CPT | Mod: 26,MA

## 2023-12-16 PROCEDURE — 82435 ASSAY OF BLOOD CHLORIDE: CPT

## 2023-12-16 PROCEDURE — 99285 EMERGENCY DEPT VISIT HI MDM: CPT

## 2023-12-16 PROCEDURE — 80053 COMPREHEN METABOLIC PANEL: CPT

## 2023-12-16 PROCEDURE — 82330 ASSAY OF CALCIUM: CPT

## 2023-12-16 PROCEDURE — 82947 ASSAY GLUCOSE BLOOD QUANT: CPT

## 2023-12-16 PROCEDURE — 84132 ASSAY OF SERUM POTASSIUM: CPT

## 2023-12-16 PROCEDURE — 36415 COLL VENOUS BLD VENIPUNCTURE: CPT

## 2023-12-16 PROCEDURE — 83605 ASSAY OF LACTIC ACID: CPT

## 2023-12-16 PROCEDURE — 99284 EMERGENCY DEPT VISIT MOD MDM: CPT | Mod: 25

## 2023-12-16 PROCEDURE — 83690 ASSAY OF LIPASE: CPT

## 2023-12-16 PROCEDURE — 96374 THER/PROPH/DIAG INJ IV PUSH: CPT | Mod: XU

## 2023-12-16 PROCEDURE — 85014 HEMATOCRIT: CPT

## 2023-12-16 PROCEDURE — 81001 URINALYSIS AUTO W/SCOPE: CPT

## 2023-12-16 PROCEDURE — 87086 URINE CULTURE/COLONY COUNT: CPT

## 2023-12-16 PROCEDURE — 85025 COMPLETE CBC W/AUTO DIFF WBC: CPT

## 2023-12-16 PROCEDURE — 74177 CT ABD & PELVIS W/CONTRAST: CPT | Mod: MA

## 2023-12-16 RX ORDER — ACETAMINOPHEN 500 MG
1000 TABLET ORAL ONCE
Refills: 0 | Status: COMPLETED | OUTPATIENT
Start: 2023-12-16 | End: 2023-12-16

## 2023-12-16 RX ORDER — SODIUM CHLORIDE 9 MG/ML
1000 INJECTION, SOLUTION INTRAVENOUS ONCE
Refills: 0 | Status: COMPLETED | OUTPATIENT
Start: 2023-12-16 | End: 2023-12-16

## 2023-12-16 RX ADMIN — Medication 400 MILLIGRAM(S): at 11:06

## 2023-12-16 RX ADMIN — SODIUM CHLORIDE 1000 MILLILITER(S): 9 INJECTION, SOLUTION INTRAVENOUS at 11:06

## 2023-12-16 NOTE — ED PROVIDER NOTE - PROGRESS NOTE DETAILS
Labs and imaging non-actionable. Patient tolerating PO, feeling well. Stable for d/c. - Lalita Perez PA-C

## 2023-12-16 NOTE — ED PROVIDER NOTE - ATTENDING APP SHARED VISIT CONTRIBUTION OF CARE
Attending MD Calhoun:   I personally have seen and examined this patient. I have performed a substantive portion of the visit including all aspects of the medical decision making.   Physician assistant note reviewed and agree on plan of care and except where noted.        78-year-old woman presenting for evaluation of 3 days of lower abdominal pain.  Patient describes pain as a pressure-like sensation.  Is worse with she is walking.  Denies any nausea vomiting diarrhea fevers or chills.  No urinary symptoms.  Normal oral intake.  Has not taken any over-the-counter pain medications today.  Pertinent surgical history is for hysterectomy many years ago.    Patient's vital signs are within normal limits.  She is lying in the stretcher well-appearing in no apparent distress.  No increased work of breathing clear lungs anteriorly regular heart sounds no obvious murmur.  The abdomen is soft nondistended nontender.  Extremities warm and well-perfused.    Patient with acute lower abdominal pain for 3 days, benign abdominal examination however given age will obtain CT abdomen pelvis for further investigations.  Differential diagnosis would include diverticulitis, colitis, appendicitis, UTI.        *The above represents an initial assessment/impression. Please refer to progress notes for potential changes in patient clinical course*

## 2023-12-16 NOTE — ED PROVIDER NOTE - PATIENT PORTAL LINK FT
You can access the FollowMyHealth Patient Portal offered by [facility] by registering at the following website: [email or web address]. By joining [employer]’s FollowMyHealth portal, you will also be able to view your health information using other applications (apps) compatible with our system. You can access the FollowMyHealth Patient Portal offered by [facility] by registering at the following website: [email or web address]. By joining [employer]’s FollowMyHealth portal, you will also be able to view your health information using other applications (apps) compatible with our system.

## 2023-12-16 NOTE — ED ADULT NURSE NOTE - OBJECTIVE STATEMENT
Pt came in with diffuse lower abdominal pain x 4 days. Reports pain is across lower abdomen, constant, feels "heavy," worse with walking. Pt is in no distress. Breathing easy and non labored. Pt is ambulatory. Pt is alert and orientedX4.

## 2023-12-16 NOTE — ED ADULT NURSE NOTE - NSFALLUNIVINTERV_ED_ALL_ED
Bed/Stretcher in lowest position, wheels locked, appropriate side rails in place/Call bell, personal items and telephone in reach/Instruct patient to call for assistance before getting out of bed/chair/stretcher/Non-slip footwear applied when patient is off stretcher/[place] to call system/Physically safe environment - no spills, clutter or unnecessary equipment/Purposeful proactive rounding/Room/bathroom lighting operational, light cord in reach Bed/Stretcher in lowest position, wheels locked, appropriate side rails in place/Call bell, personal items and telephone in reach/Instruct patient to call for assistance before getting out of bed/chair/stretcher/Non-slip footwear applied when patient is off stretcher/[place] to call system/Physically safe environment - no spills, clutter or unnecessary equipment/Purposeful proactive rounding/Room/bathroom lighting operational, light cord in reach

## 2023-12-16 NOTE — ED PROVIDER NOTE - NSFOLLOWUPINSTRUCTIONS_ED_ALL_ED_FT
Stay well hydrated. Eat a bland diet.   Advance diet as tolerated and eat small frequent meals.     Follow up with your primary care provider upon discharge.    Follow up with Gastroenterology this week for further evaluation.  Bring copy of your printed results with you.     ***Please follow up CT findings of stable lung nodules and small kidney lesion with your Primary Care Provider. ***    Return to ER for fever, new/worsening abdominal pain, vomiting, persistent or bloody diarrhea, inability to tolerate food/fluid, chest pain, shortness of breath, or any other concerning symptoms.

## 2023-12-16 NOTE — ED PROVIDER NOTE - CPE EDP GASTRO NORM
- - - S Plasty Text: Given the location and shape of the defect, and the orientation of relaxed skin tension lines, an S-plasty was deemed most appropriate for repair.  Using a sterile surgical marker, the appropriate outline of the S-plasty was drawn, incorporating the defect and placing the expected incisions within the relaxed skin tension lines where possible.  The area thus outlined was incised deep to adipose tissue with a #15 scalpel blade.  The skin margins were undermined to an appropriate distance in all directions utilizing iris scissors. The skin flaps were advanced over the defect.  The opposing margins were then approximated with interrupted buried subcutaneous sutures.

## 2023-12-16 NOTE — ED PROVIDER NOTE - OBJECTIVE STATEMENT
77yo F with PMH hypothyroidism, PSH hysterectomy presenting with diffuse lower abdominal pain x 4 days. Reports pain is across lower abdomen, constant, feels "heavy," worse with walking. Pain is unchanged with eating. Reports she took advil and tylenol day of onset with no relief. Reports h/o UTIs in the past, but this time has no urinary symptoms and does not feel the same. Last BM this morning and normal. Denies any fever/chills, CP, SOB, n/v/d, dysuria, hematuria, abnl vaginal discharge, back pain, any other abdominal surgeries. 77yo F with PMH hypothyroidism, PSH hysterectomy presenting with diffuse lower abdominal pain x 4 days. Reports pain is across lower abdomen, constant, feels "heavy," worse with walking. Pain is unchanged with eating. Reports she took advil and tylenol day of onset with no relief. Reports h/o UTIs in the past, but this time has no urinary symptoms and does not feel the same. Last BM this morning and normal. Denies any fever/chills, CP, SOB, n/v/d, BRBPR, melena, dysuria, hematuria, abnl vaginal discharge, back pain, any other abdominal surgeries.

## 2023-12-18 LAB
CULTURE RESULTS: SIGNIFICANT CHANGE UP
CULTURE RESULTS: SIGNIFICANT CHANGE UP
SPECIMEN SOURCE: SIGNIFICANT CHANGE UP
SPECIMEN SOURCE: SIGNIFICANT CHANGE UP

## 2023-12-19 ENCOUNTER — OUTPATIENT (OUTPATIENT)
Dept: OUTPATIENT SERVICES | Facility: HOSPITAL | Age: 78
LOS: 1 days | End: 2023-12-19
Payer: MEDICARE

## 2023-12-19 ENCOUNTER — APPOINTMENT (OUTPATIENT)
Dept: INTERNAL MEDICINE | Facility: CLINIC | Age: 78
End: 2023-12-19
Payer: MEDICARE

## 2023-12-19 VITALS
BODY MASS INDEX: 30.44 KG/M2 | WEIGHT: 145 LBS | SYSTOLIC BLOOD PRESSURE: 118 MMHG | HEART RATE: 96 BPM | HEIGHT: 58 IN | OXYGEN SATURATION: 98 % | DIASTOLIC BLOOD PRESSURE: 80 MMHG

## 2023-12-19 DIAGNOSIS — N39.0 URINARY TRACT INFECTION, SITE NOT SPECIFIED: ICD-10-CM

## 2023-12-19 DIAGNOSIS — K57.30 DIVERTICULOSIS OF LARGE INTESTINE W/OUT PERFORATION OR ABSCESS W/OUT BLEEDING: ICD-10-CM

## 2023-12-19 DIAGNOSIS — R10.9 UNSPECIFIED ABDOMINAL PAIN: ICD-10-CM

## 2023-12-19 DIAGNOSIS — Z98.49 CATARACT EXTRACTION STATUS, UNSPECIFIED EYE: Chronic | ICD-10-CM

## 2023-12-19 DIAGNOSIS — K57.30 DIVERTICULOSIS OF LARGE INTESTINE WITHOUT PERFORATION OR ABSCESS WITHOUT BLEEDING: ICD-10-CM

## 2023-12-19 DIAGNOSIS — I10 ESSENTIAL (PRIMARY) HYPERTENSION: ICD-10-CM

## 2023-12-19 PROCEDURE — G0463: CPT

## 2023-12-19 PROCEDURE — 99214 OFFICE O/P EST MOD 30 MIN: CPT

## 2023-12-30 PROBLEM — N39.0 RECURRENT UTI: Status: ACTIVE | Noted: 2021-03-02

## 2023-12-30 PROBLEM — R10.9 ABDOMINAL PAIN, UNSPECIFIED ABDOMINAL LOCATION: Status: ACTIVE | Noted: 2021-04-19

## 2023-12-30 PROBLEM — K57.30 DIVERTICULOSIS OF COLON: Status: ACTIVE | Noted: 2018-10-03

## 2023-12-30 NOTE — HISTORY OF PRESENT ILLNESS
[FreeTextEntry8] : Ms. ALEKSANDR PADILLA is a 78 year old Other race female with history of Hypothyroidism, osteopenia, Sjogrens syndrome, pulmonary nodule, recurrent UTI, cataract, glaucoma suspect presented today for abnormal imaging of kidney at the ED [facility] 12/16/23. I saw pt 11/16/23 for wellness visit.   Reviewed ED note 12/16/23. She went to ED [facility] for diffuse lower abdominal pain x 4 days. Reports pain is across lower abdomen, constant, feels "heavy," worse with walking. Differential diagnosis would include diverticulitis, colitis, appendicitis, UTI. Given age, CT A/P performed and unremarkable. Discharged to home with f/up PCP and GI. She was told to stay well hydrated, eat a bland diet and can advance diet as tolerated and eat small frequent meals. Her lab CBC, CMP, UA were all unremarkable. Urine culture noted <10,000  normal urogenital julius.  She denied further abdominal pain and tolerating food , but worries if her kidney has any issues. Denied fever, chills,CP, SOB, abdominal pain, n/v/c/d.

## 2023-12-30 NOTE — PHYSICAL EXAM
[Soft] : abdomen soft [Non Tender] : non-tender [Non-distended] : non-distended [No Masses] : no abdominal mass palpated [No HSM] : no HSM [Normal Bowel Sounds] : normal bowel sounds [No CVA Tenderness] : no CVA  tenderness [de-identified] : WDWN in NAD HEENT:  unremarkable Neck:  supple, no JVD, no LN Lungs:  CTA B/L, no W/R/R Heart:  Reg rate, +S1S2, no M/R/G Abdomen:  soft, NT, ND, +BS, no masses, no HS-megaly Genital: No pubic or genital lesions noted. Ext:  no C/C/E Neuro:  no focal deficits

## 2023-12-30 NOTE — REVIEW OF SYSTEMS
[FreeTextEntry2] : Constitutional:  no fever and no chills.  Eyes:  no discharge.  HEENT:  no earache.  Cardiovascular:  no chest pain, no palpitations and no lower extremity edema.  Respiratory:  no shortness of breath, no wheezing and no cough.  Gastrointestinal:  no abdominal pain, no nausea and no vomiting.  Genitourinary:  no dysuria.  Musculoskeletal:  no joint pain.  Integumentary:  no itching.  Neurological:  no headache.  Psychiatric:  not suicidal.  Hematologic/Lymphatic:  no easy bleeding. [FreeTextEntry7] : see hpi [FreeTextEntry8] : see hpi

## 2023-12-30 NOTE — ASSESSMENT
[FreeTextEntry1] : Ms. ALEKSANDR PADILLA is a 78 year old Other race female with history of Hypothyroidism, osteopenia, Sjogrens syndrome, pulmonary nodule, recurrent UTI, cataract, glaucoma suspect presented today for abnormal imaging of kidney at the hospital.  # ED visit for vague abdominal pain Sx has resolved.  Reviewed ED record with pt and provided reassurance.  Reviewed 12/17/23  CT A/P with IC for abdominal pain:  No acute abdominal/pelvic pathology or primary etiology of pain. -KIDNEYS/URETERS: Too small to characterize LEFT renal hypodensity is statistically no significance. Symmetric renal enhancement without calculi/ obstructive uropathy. . -BLADDER: Within normal limits. -REPRODUCTIVE ORGANS: Hysterectomy. No adnexal mass -BOWEL: Small hiatus hernia. Underdistended / nonevaluable stomach. No bowel obstruction. Normal appendix. Scattered colonic diverticula without diverticulitis.  Reviewed Last colonoscopy by Dr Benitez 6/24/21: Multiple small and large mouthed diverticula were found in sigmoid colon and ascending colon. No specimen collected. F/u 5 year.  Made GI referral as ED recommended.  RTO as needed.

## 2024-01-09 ENCOUNTER — APPOINTMENT (OUTPATIENT)
Dept: RADIOLOGY | Facility: IMAGING CENTER | Age: 79
End: 2024-01-09
Payer: MEDICARE

## 2024-01-09 ENCOUNTER — RESULT REVIEW (OUTPATIENT)
Age: 79
End: 2024-01-09

## 2024-01-09 ENCOUNTER — APPOINTMENT (OUTPATIENT)
Dept: MAMMOGRAPHY | Facility: IMAGING CENTER | Age: 79
End: 2024-01-09
Payer: MEDICARE

## 2024-01-09 ENCOUNTER — OUTPATIENT (OUTPATIENT)
Dept: OUTPATIENT SERVICES | Facility: HOSPITAL | Age: 79
LOS: 1 days | End: 2024-01-09
Payer: MEDICARE

## 2024-01-09 DIAGNOSIS — Z12.39 ENCOUNTER FOR OTHER SCREENING FOR MALIGNANT NEOPLASM OF BREAST: ICD-10-CM

## 2024-01-09 PROCEDURE — 77067 SCR MAMMO BI INCL CAD: CPT | Mod: 26

## 2024-01-09 PROCEDURE — 77063 BREAST TOMOSYNTHESIS BI: CPT | Mod: 26

## 2024-01-09 PROCEDURE — 77063 BREAST TOMOSYNTHESIS BI: CPT

## 2024-01-09 PROCEDURE — 77080 DXA BONE DENSITY AXIAL: CPT

## 2024-01-09 PROCEDURE — 77067 SCR MAMMO BI INCL CAD: CPT

## 2024-01-09 PROCEDURE — 77080 DXA BONE DENSITY AXIAL: CPT | Mod: 26

## 2024-01-15 ENCOUNTER — NON-APPOINTMENT (OUTPATIENT)
Age: 79
End: 2024-01-15

## 2024-01-23 ENCOUNTER — APPOINTMENT (OUTPATIENT)
Dept: RHEUMATOLOGY | Facility: CLINIC | Age: 79
End: 2024-01-23

## 2024-01-31 ENCOUNTER — APPOINTMENT (OUTPATIENT)
Age: 79
End: 2024-01-31

## 2024-02-06 ENCOUNTER — APPOINTMENT (OUTPATIENT)
Dept: OPHTHALMOLOGY | Facility: CLINIC | Age: 79
End: 2024-02-06

## 2024-03-19 ENCOUNTER — LABORATORY RESULT (OUTPATIENT)
Age: 79
End: 2024-03-19

## 2024-03-19 ENCOUNTER — APPOINTMENT (OUTPATIENT)
Dept: RHEUMATOLOGY | Facility: CLINIC | Age: 79
End: 2024-03-19
Payer: MEDICARE

## 2024-03-19 VITALS
BODY MASS INDEX: 32.62 KG/M2 | OXYGEN SATURATION: 97 % | WEIGHT: 145 LBS | DIASTOLIC BLOOD PRESSURE: 86 MMHG | RESPIRATION RATE: 16 BRPM | SYSTOLIC BLOOD PRESSURE: 137 MMHG | HEIGHT: 56 IN | HEART RATE: 85 BPM

## 2024-03-19 DIAGNOSIS — M17.9 OSTEOARTHRITIS OF KNEE, UNSPECIFIED: ICD-10-CM

## 2024-03-19 PROCEDURE — 99214 OFFICE O/P EST MOD 30 MIN: CPT

## 2024-03-19 NOTE — PHYSICAL EXAM
[General Appearance - In No Acute Distress] : in no acute distress [General Appearance - Alert] : alert [General Appearance - Well-Appearing] : healthy appearing [Auscultation Breath Sounds / Voice Sounds] : lungs were clear to auscultation bilaterally [Respiration, Rhythm And Depth] : normal respiratory rhythm and effort [Heart Rate And Rhythm] : heart rate was normal and rhythm regular [Heart Sounds] : normal S1 and S2 [Edema] : there was no peripheral edema [] : no rash [Oriented To Time, Place, And Person] : oriented to person, place, and time [Impaired Insight] : insight and judgment were intact [Affect] : the affect was normal [FreeTextEntry1] : Swelling and warmth over the L wrist resolved. No TTP

## 2024-03-19 NOTE — HISTORY OF PRESENT ILLNESS
[___ Month(s) Ago] : [unfilled] month(s) ago [Arthralgias] : arthralgias [Dry Eyes] : dry eyes [FreeTextEntry1] : C/o on and off sharp L knee pain and muscle stiffness in the R knee with activity and prolonged rest. Known OA. No new wrist pain.  [Weight Loss] : no weight loss [Fever] : no fever [Chills] : no chills [Malar Facial Rash] : no malar facial rash [Skin Lesions] : no lesions [Skin Nodules] : no skin nodules [Oral Ulcers] : no oral ulcers [Cough] : no cough [Dry Mouth] : no dry mouth [Dysphagia] : no dysphagia [Shortness of Breath] : no shortness of breath [Chest Pain] : no chest pain [Joint Swelling] : no joint swelling [Joint Warmth] : no joint warmth [Joint Deformity] : no joint deformity [Morning Stiffness] : no morning stiffness [Eye Pain] : no eye pain [Eye Redness] : no eye redness

## 2024-03-19 NOTE — ASSESSMENT
[FreeTextEntry1] : 78 y.o F with PMH of Sjogren's syndrome, hypothyroidism, osteopenia and OA here for follow up  1. L wrist swelling: ? calcific tendinitis versus crystalline versus inflammatory arthropathy. US negative. Symptoms resolved with Medrol dose pack. No recurrence.   2. Sjogren's Syndrome -low risk profile with mostly sicca symptoms -given age highly doubt that she will develop a more concerning phenotype with muscle/lung involvement -prior CT chests have noted stable pulmonary nodules in lung bases however resolved on XT A/P in 2021 -not likely to have neurologic/heme/renal involvement - Repeat labs today. Baseline TTE was normal, PFTs pending - reordered today.  - Symptomatic management of sicca for now  3. b/l knee pain R>L: OA with chondrocalcinosis per knee x-ray. Diclofenac gel OTC and apply 2-3x/day and Lidocaine 4% OTC patch as needed. Referred for PT.   4. Bone health: -DEXA 01/2024 with Osteopenia. No indication to treat with BPP at this time  RTC 3 months.

## 2024-03-26 ENCOUNTER — APPOINTMENT (OUTPATIENT)
Dept: PULMONOLOGY | Facility: CLINIC | Age: 79
End: 2024-03-26
Payer: MEDICARE

## 2024-03-26 PROCEDURE — 94726 PLETHYSMOGRAPHY LUNG VOLUMES: CPT

## 2024-03-26 PROCEDURE — 94010 BREATHING CAPACITY TEST: CPT

## 2024-03-26 PROCEDURE — 94729 DIFFUSING CAPACITY: CPT

## 2024-04-11 LAB
ALBUMIN SERPL ELPH-MCNC: 4.5 G/DL
ALP BLD-CCNC: 95 U/L
ALT SERPL-CCNC: 30 U/L
ANION GAP SERPL CALC-SCNC: 11 MMOL/L
APPEARANCE: CLEAR
AST SERPL-CCNC: 23 U/L
BASOPHILS # BLD AUTO: 0.03 K/UL
BASOPHILS NFR BLD AUTO: 0.5 %
BILIRUB SERPL-MCNC: 0.3 MG/DL
BILIRUBIN URINE: NEGATIVE
BLOOD URINE: NEGATIVE
BUN SERPL-MCNC: 15 MG/DL
C3 SERPL-MCNC: 166 MG/DL
C4 SERPL-MCNC: 30 MG/DL
CALCIUM SERPL-MCNC: 9.9 MG/DL
CHLORIDE SERPL-SCNC: 104 MMOL/L
CK SERPL-CCNC: 60 U/L
CO2 SERPL-SCNC: 25 MMOL/L
COLOR: YELLOW
CREAT SERPL-MCNC: 0.77 MG/DL
CREAT SPEC-SCNC: 131 MG/DL
CREAT/PROT UR: 0.1 RATIO
DEPRECATED KAPPA LC FREE/LAMBDA SER: 1.6 RATIO
DSDNA AB SER-ACNC: <12 IU/ML
EGFR: 80 ML/MIN/1.73M2
EOSINOPHIL # BLD AUTO: 0.08 K/UL
EOSINOPHIL NFR BLD AUTO: 1.4 %
GLUCOSE QUALITATIVE U: NEGATIVE MG/DL
GLUCOSE SERPL-MCNC: 93 MG/DL
HCT VFR BLD CALC: 40.3 %
HGB BLD-MCNC: 13.6 G/DL
IGA SER QL IEP: 211 MG/DL
IGG SER QL IEP: 1396 MG/DL
IGM SER QL IEP: 47 MG/DL
IMM GRANULOCYTES NFR BLD AUTO: 0.3 %
KAPPA LC CSF-MCNC: 1.76 MG/DL
KAPPA LC SERPL-MCNC: 2.81 MG/DL
KETONES URINE: NEGATIVE MG/DL
LEUKOCYTE ESTERASE URINE: ABNORMAL
LYMPHOCYTES # BLD AUTO: 2.21 K/UL
LYMPHOCYTES NFR BLD AUTO: 37.9 %
MAN DIFF?: NORMAL
MCHC RBC-ENTMCNC: 29.6 PG
MCHC RBC-ENTMCNC: 33.7 GM/DL
MCV RBC AUTO: 87.6 FL
MONOCYTES # BLD AUTO: 0.32 K/UL
MONOCYTES NFR BLD AUTO: 5.5 %
NEUTROPHILS # BLD AUTO: 3.17 K/UL
NEUTROPHILS NFR BLD AUTO: 54.4 %
NITRITE URINE: NEGATIVE
PH URINE: 6
PLATELET # BLD AUTO: 345 K/UL
POTASSIUM SERPL-SCNC: 4.2 MMOL/L
PROT SERPL-MCNC: 7.7 G/DL
PROT UR-MCNC: 9 MG/DL
PROTEIN URINE: NEGATIVE MG/DL
RBC # BLD: 4.6 M/UL
RBC # FLD: 14.8 %
RHEUMATOID FACT SER QL: 20 IU/ML
SODIUM SERPL-SCNC: 140 MMOL/L
SPECIFIC GRAVITY URINE: 1.02
UROBILINOGEN URINE: 0.2 MG/DL
WBC # FLD AUTO: 5.83 K/UL

## 2024-04-12 NOTE — ED PROVIDER NOTE - SKIN, MLM
Skin normal color for race, warm, dry and intact. No evidence of rash. Pt. is scheduled for a colonoscopy 4/18/23.  Pt. verbalized understanding of instructions.

## 2024-04-15 ENCOUNTER — APPOINTMENT (OUTPATIENT)
Dept: GASTROENTEROLOGY | Facility: CLINIC | Age: 79
End: 2024-04-15
Payer: MEDICARE

## 2024-04-15 VITALS
DIASTOLIC BLOOD PRESSURE: 79 MMHG | SYSTOLIC BLOOD PRESSURE: 128 MMHG | HEIGHT: 56 IN | BODY MASS INDEX: 32.84 KG/M2 | HEART RATE: 94 BPM | WEIGHT: 146 LBS | OXYGEN SATURATION: 97 %

## 2024-04-15 DIAGNOSIS — K21.9 GASTRO-ESOPHAGEAL REFLUX DISEASE W/OUT ESOPHAGITIS: ICD-10-CM

## 2024-04-15 DIAGNOSIS — K57.30 DIVERTICULOSIS OF LARGE INTESTINE W/OUT PERFORATION OR ABSCESS W/OUT BLEEDING: ICD-10-CM

## 2024-04-15 DIAGNOSIS — K44.9 DIAPHRAGMATIC HERNIA W/OUT OBSTRUCTION OR GANGRENE: ICD-10-CM

## 2024-04-15 PROCEDURE — 99214 OFFICE O/P EST MOD 30 MIN: CPT

## 2024-04-15 RX ORDER — FAMOTIDINE 20 MG/1
20 TABLET, FILM COATED ORAL TWICE DAILY
Qty: 60 | Refills: 11 | Status: ACTIVE | COMMUNITY
Start: 2024-04-15 | End: 1900-01-01

## 2024-04-15 NOTE — HISTORY OF PRESENT ILLNESS
[FreeTextEntry1] : Usha presents for follow-up visit.  She was in the emergency room a few months ago with symptoms of abdominal pain.  Blood work including CBC was unremarkable.  CT abdomen pelvis showing a small hiatal hernia, rectus diastasis with small fat-containing umbilical hernia and diverticulosis.  She is wondering what all this means.  I explained to her the meaning of the above.  She reports GERD symptoms at least 4 times a week.  She denies dysphagia or odynophagia.  She takes over-the-counter antacids with response.  She denies changes in bowel habits.  She denies rectal bleeding or melena.  She denies weight loss or anemia.

## 2024-04-15 NOTE — ASSESSMENT
[FreeTextEntry1] : This is a 78-year-old female presenting with symptoms of chronic GERD with a recent CT showing a small hiatal hernia.  I explained to her the meaning of GERD and a hiatal hernia.  I recommend she eat small meals and avoid laying down after eating.  I recommend famotidine 20 mg twice a day.  CT also showing diverticulosis without evidence of diverticulitis.  Her last colonoscopy in 2021 with diverticulosis otherwise normal exam.  She does have a history of colon polyps.  She will need surveillance colonoscopy in 2026.  Multiple questions were answered.  She stated understanding.

## 2024-04-15 NOTE — REASON FOR VISIT
[Follow-up] : a follow-up of an existing diagnosis [FreeTextEntry1] : hiatal hernia, GERD, diverticulosis

## 2024-04-15 NOTE — PHYSICAL EXAM

## 2024-06-19 ENCOUNTER — LABORATORY RESULT (OUTPATIENT)
Age: 79
End: 2024-06-19

## 2024-06-19 ENCOUNTER — APPOINTMENT (OUTPATIENT)
Dept: RHEUMATOLOGY | Facility: CLINIC | Age: 79
End: 2024-06-19
Payer: MEDICARE

## 2024-06-19 VITALS
BODY MASS INDEX: 33.74 KG/M2 | TEMPERATURE: 98.1 F | RESPIRATION RATE: 16 BRPM | SYSTOLIC BLOOD PRESSURE: 131 MMHG | OXYGEN SATURATION: 98 % | WEIGHT: 150 LBS | DIASTOLIC BLOOD PRESSURE: 84 MMHG | HEIGHT: 56 IN | HEART RATE: 88 BPM

## 2024-06-19 PROCEDURE — 99214 OFFICE O/P EST MOD 30 MIN: CPT

## 2024-06-19 PROCEDURE — G2211 COMPLEX E/M VISIT ADD ON: CPT

## 2024-06-19 NOTE — HISTORY OF PRESENT ILLNESS
[___ Month(s) Ago] : [unfilled] month(s) ago [Arthralgias] : arthralgias [Dry Eyes] : dry eyes [FreeTextEntry1] : Doing well. Knee pain and LE weakness improving with PT. No new complaints  [Weight Loss] : no weight loss [Fever] : no fever [Chills] : no chills [Malar Facial Rash] : no malar facial rash [Skin Lesions] : no lesions [Skin Nodules] : no skin nodules [Oral Ulcers] : no oral ulcers [Cough] : no cough [Dry Mouth] : no dry mouth [Dysphagia] : no dysphagia [Shortness of Breath] : no shortness of breath [Chest Pain] : no chest pain [Joint Swelling] : no joint swelling [Joint Warmth] : no joint warmth [Joint Deformity] : no joint deformity [Morning Stiffness] : no morning stiffness [Eye Pain] : no eye pain [Eye Redness] : no eye redness

## 2024-06-19 NOTE — DATA REVIEWED
[FreeTextEntry1] : CT abdomen and pelvis (12/2023) LOWER CHEST: 5 mm pleural-based RML (3/9) and LLL (3/8) nodules, unchanged and therefore of no significance. Millimeter-sized RLL calcified granuloma LIVER: Within normal limits. BILE DUCTS: Normal caliber. GALLBLADDER: Within normal limits. SPLEEN: Within normal limits. PANCREAS: Within normal limits. ADRENALS: Within normal limits. KIDNEYS/URETERS: Too small to characterize LEFT renal hypodensity is statistically no significance. Symmetric renal enhancement without calculi/ obstructive uropathy. . BLADDER: Within normal limits. REPRODUCTIVE ORGANS: Hysterectomy. No adnexal mass BOWEL: Small hiatus hernia. Underdistended / nonevaluable stomach. No bowel obstruction. Normal appendix. Scattered colonic diverticula without diverticulitis. PERITONEUM: No ascites. VESSELS: Within normal limits. RETROPERITONEUM/LYMPH NODES: No lymphadenopathy. ABDOMINAL WALL: Rectus diastasis and small fat-containing umbilical hernia BONES: Lower thoracic/lumbar spondylosis. Mild bilateral hip osteoarthritis IMPRESSION: No acute abdominal/pelvic pathology or primary etiology of pain.  DEXA (01/2024): Spine: -1.5, osteopenia; previously -1.7. Femoral neck: -1.1 , osteopenia; previously -0.8. Total hip: 0.0 , normal; previously 0.4. IMPRESSION: Osteopenia. FRACTURE RISK: Using the FRAX 10 year fracture risk calculator the patient's ten-year risk of any fracture is 5.8% and the patient's risk of hip fracture is 1.0%. Additional risk factors used in the FRAX calculation: None. TREATMENT RECOMMENDATIONS: Based on NOF treatment guidelines medical therapy is not recommended at this time.  Wrist US (09/2023): There is no discrete osseous erosion. There is no evidence of tenosynovitis or hyperemic blood flow to suggest synovitis within the left wrist. The visualized tendons are unremarkable. Impression: No evidence for acute inflammatory arthropathy.   Knee x-ray (03/2023): Small right knee effusion. No left knee effusion. Bilateral intra-articular tibiofemoral chondrocalcinosis. Bilateral knee tri compartment osteoarthritic changes. No joint margin erosions. Nonspecific small nodular calcification in posterior right knee soft tissues. Unremarkable quadriceps and patellar tendon shadows. No destructive osseous lesions or periosteal reaction.

## 2024-06-19 NOTE — PHYSICAL EXAM
[General Appearance - Alert] : alert [General Appearance - In No Acute Distress] : in no acute distress [General Appearance - Well-Appearing] : healthy appearing [Respiration, Rhythm And Depth] : normal respiratory rhythm and effort [Auscultation Breath Sounds / Voice Sounds] : lungs were clear to auscultation bilaterally [Heart Rate And Rhythm] : heart rate was normal and rhythm regular [Heart Sounds] : normal S1 and S2 [Edema] : there was no peripheral edema [] : no rash [Oriented To Time, Place, And Person] : oriented to person, place, and time [Impaired Insight] : insight and judgment were intact [Affect] : the affect was normal [FreeTextEntry1] : Swelling and warmth over the L wrist resolved. No TTP

## 2024-06-19 NOTE — ASSESSMENT
[FreeTextEntry1] : 78 y.o F with PMH of Sjogren's syndrome, hypothyroidism, osteopenia and OA here for follow up  1. L wrist swelling: ? calcific tendinitis versus crystalline versus inflammatory arthropathy. US negative. Symptoms resolved with Medrol dose pack. No recurrence.   2. Sjogren's Syndrome -low risk profile with mostly sicca symptoms -given age highly doubt that she will develop a more concerning phenotype with muscle/lung involvement -prior CT chests have noted stable pulmonary nodules in lung bases however resolved on XT A/P in 2021 -not likely to have neurologic/heme/renal involvement - Repeat labs today. Baseline TTE was normal, PFTs  normal in March 2024 - Symptomatic management of sicca for now  3. b/l knee pain R>L: OA with chondrocalcinosis per knee x-ray. Diclofenac gel OTC and apply 2-3x/day and Lidocaine 4% OTC patch as needed. Continue PT.   4. Bone health: -DEXA 01/2024 with Osteopenia. No indication to treat with BPP at this time  RTC 3 months.

## 2024-06-20 LAB
ALBUMIN SERPL ELPH-MCNC: 4.6 G/DL
ALP BLD-CCNC: 89 U/L
ALT SERPL-CCNC: 51 U/L
ANION GAP SERPL CALC-SCNC: 14 MMOL/L
APPEARANCE: CLEAR
AST SERPL-CCNC: 43 U/L
BASOPHILS # BLD AUTO: 0.03 K/UL
BASOPHILS NFR BLD AUTO: 0.5 %
BILIRUB SERPL-MCNC: 0.2 MG/DL
BILIRUBIN URINE: NEGATIVE
BLOOD URINE: NEGATIVE
BUN SERPL-MCNC: 19 MG/DL
C3 SERPL-MCNC: 171 MG/DL
C4 SERPL-MCNC: 29 MG/DL
CALCIUM SERPL-MCNC: 10.1 MG/DL
CHLORIDE SERPL-SCNC: 102 MMOL/L
CK SERPL-CCNC: 78 U/L
CO2 SERPL-SCNC: 22 MMOL/L
COLOR: YELLOW
CREAT SERPL-MCNC: 0.81 MG/DL
CREAT SPEC-SCNC: 88 MG/DL
CREAT/PROT UR: 0.1 RATIO
DEPRECATED KAPPA LC FREE/LAMBDA SER: 1.35 RATIO
DSDNA AB SER-ACNC: <1 IU/ML
EGFR: 75 ML/MIN/1.73M2
EOSINOPHIL # BLD AUTO: 0.12 K/UL
EOSINOPHIL NFR BLD AUTO: 2.1 %
GLUCOSE QUALITATIVE U: NEGATIVE MG/DL
GLUCOSE SERPL-MCNC: 93 MG/DL
HCT VFR BLD CALC: 41.4 %
HGB BLD-MCNC: 13.4 G/DL
IGA SER QL IEP: 214 MG/DL
IGG SER QL IEP: 1451 MG/DL
IGM SER QL IEP: 51 MG/DL
IMM GRANULOCYTES NFR BLD AUTO: 0.5 %
KAPPA LC CSF-MCNC: 1.86 MG/DL
KAPPA LC SERPL-MCNC: 2.52 MG/DL
KETONES URINE: NEGATIVE MG/DL
LEUKOCYTE ESTERASE URINE: ABNORMAL
LYMPHOCYTES # BLD AUTO: 2.23 K/UL
LYMPHOCYTES NFR BLD AUTO: 39.9 %
MAN DIFF?: NORMAL
MCHC RBC-ENTMCNC: 29.2 PG
MCHC RBC-ENTMCNC: 32.4 GM/DL
MCV RBC AUTO: 90.2 FL
MONOCYTES # BLD AUTO: 0.37 K/UL
MONOCYTES NFR BLD AUTO: 6.6 %
NEUTROPHILS # BLD AUTO: 2.81 K/UL
NEUTROPHILS NFR BLD AUTO: 50.4 %
NITRITE URINE: NEGATIVE
PH URINE: 6.5
PLATELET # BLD AUTO: 326 K/UL
POTASSIUM SERPL-SCNC: 4.3 MMOL/L
PROT SERPL-MCNC: 7.9 G/DL
PROT UR-MCNC: 6 MG/DL
PROTEIN URINE: NEGATIVE MG/DL
RBC # BLD: 4.59 M/UL
RBC # FLD: 15 %
RHEUMATOID FACT SER QL: 19 IU/ML
SODIUM SERPL-SCNC: 138 MMOL/L
SPECIFIC GRAVITY URINE: 1.02
UROBILINOGEN URINE: 0.2 MG/DL
WBC # FLD AUTO: 5.59 K/UL

## 2024-09-18 ENCOUNTER — APPOINTMENT (OUTPATIENT)
Dept: INTERNAL MEDICINE | Facility: CLINIC | Age: 79
End: 2024-09-18
Payer: MEDICARE

## 2024-09-18 ENCOUNTER — OUTPATIENT (OUTPATIENT)
Dept: OUTPATIENT SERVICES | Facility: HOSPITAL | Age: 79
LOS: 1 days | End: 2024-09-18
Payer: COMMERCIAL

## 2024-09-18 VITALS
WEIGHT: 147 LBS | SYSTOLIC BLOOD PRESSURE: 126 MMHG | HEART RATE: 80 BPM | BODY MASS INDEX: 33.07 KG/M2 | OXYGEN SATURATION: 95 % | DIASTOLIC BLOOD PRESSURE: 86 MMHG | HEIGHT: 56 IN

## 2024-09-18 DIAGNOSIS — Z23 ENCOUNTER FOR IMMUNIZATION: ICD-10-CM

## 2024-09-18 DIAGNOSIS — R74.01 ELEVATION OF LEVELS OF LIVER TRANSAMINASE LEVELS: ICD-10-CM

## 2024-09-18 DIAGNOSIS — Z98.49 CATARACT EXTRACTION STATUS, UNSPECIFIED EYE: Chronic | ICD-10-CM

## 2024-09-18 DIAGNOSIS — I10 ESSENTIAL (PRIMARY) HYPERTENSION: ICD-10-CM

## 2024-09-18 PROCEDURE — 90677 PCV20 VACCINE IM: CPT

## 2024-09-18 PROCEDURE — 99214 OFFICE O/P EST MOD 30 MIN: CPT

## 2024-09-18 PROCEDURE — G2211 COMPLEX E/M VISIT ADD ON: CPT

## 2024-09-18 PROCEDURE — G0463: CPT

## 2024-09-18 PROCEDURE — G0009: CPT

## 2024-09-22 LAB
ALBUMIN SERPL ELPH-MCNC: 4.7 G/DL
ALP BLD-CCNC: 90 U/L
ALT SERPL-CCNC: 40 U/L
AST SERPL-CCNC: 29 U/L
BILIRUB DIRECT SERPL-MCNC: 0.1 MG/DL
BILIRUB INDIRECT SERPL-MCNC: 0.3 MG/DL
BILIRUB SERPL-MCNC: 0.4 MG/DL
HBV CORE IGG+IGM SER QL: NONREACTIVE
HBV SURFACE AB SER QL: NONREACTIVE
HBV SURFACE AG SER QL: NONREACTIVE
HCV AB SER QL: NONREACTIVE
HCV S/CO RATIO: 0.11 S/CO
HEPATITIS A IGG ANTIBODY: REACTIVE
PROT SERPL-MCNC: 7.6 G/DL

## 2024-09-22 NOTE — HISTORY OF PRESENT ILLNESS
[FreeTextEntry1] : concerned about liver [de-identified] : 78 yo female with h/o as below including preDM and hypothyroidism here for concern about her liver. Was at hospital December last year for abdominal pain, CT A/P nl but showed hiatal hernia, umbilical hernia, diastasis rectus. Saw rheumatologist and had labs showing elevated AST 43 and ALT 51, otherwise nl cmp, nl cbc, RF 19, calcium oxalate crystals in UA, nl protein/creatinine ratio, elevated c3 complement, elevated kappa FLC.  Concerned about the liver numbers.  Doesn't drink alcohol or use tylenol. Doing PT, has problem with knees. Has to see optho. No other active issues.

## 2024-09-22 NOTE — REVIEW OF SYSTEMS
Patient here to  his insulin pens and needles.  Package found in refrigerator and given to patient.     [Negative] : Heme/Lymph [FreeTextEntry7] : see hpi

## 2024-09-22 NOTE — HISTORY OF PRESENT ILLNESS
Message left. GBS neg. To call [phone] if you have questions.   [FreeTextEntry1] : concerned about liver [de-identified] : 78 yo female with h/o as below including preDM and hypothyroidism here for concern about her liver. Was at hospital December last year for abdominal pain, CT A/P nl but showed hiatal hernia, umbilical hernia, diastasis rectus. Saw rheumatologist and had labs showing elevated AST 43 and ALT 51, otherwise nl cmp, nl cbc, RF 19, calcium oxalate crystals in UA, nl protein/creatinine ratio, elevated c3 complement, elevated kappa FLC.  Concerned about the liver numbers.  Doesn't drink alcohol or use tylenol. Doing PT, has problem with knees. Has to see optho. No other active issues.

## 2024-09-22 NOTE — ASSESSMENT
[FreeTextEntry1] : 78 yo female with h/o as above including preDM and hypothyroidism here for concern about her liver after found to have elevated transaminases on prior labs. 1.  GI - will recheck hepatic panel and hepatitis titers and further plan pending results, will check hep A for immunity as well in case does have underlying liver disease (such as MAFLD) and would benefit from hep A vaccine in the future 2.  HCM - offered flu shot but would like to hold until next month; agreeable to PCV 20 now, given 3.  RTO few months cpe when due

## 2024-09-22 NOTE — ADDENDUM
[FreeTextEntry1] : 9/22/24:  Reviewed labs, nl except hep B nonimmune, consider vaccine - mailed to pt.

## 2024-09-22 NOTE — PHYSICAL EXAM
[No Acute Distress] : no acute distress [Well Nourished] : well nourished [Well Developed] : well developed [Well-Appearing] : well-appearing [Supple] : supple [No Accessory Muscle Use] : no accessory muscle use [No Respiratory Distress] : no respiratory distress  [Clear to Auscultation] : lungs were clear to auscultation bilaterally [Normal Rate] : normal rate  [Regular Rhythm] : with a regular rhythm [Normal S1, S2] : normal S1 and S2 [No Murmur] : no murmur heard [No Edema] : there was no peripheral edema [No Joint Swelling] : no joint swelling [No Rash] : no rash [Normal Gait] : normal gait [Normal Affect] : the affect was normal

## 2024-09-30 DIAGNOSIS — Z23 ENCOUNTER FOR IMMUNIZATION: ICD-10-CM

## 2024-09-30 DIAGNOSIS — R74.01 ELEVATION OF LEVELS OF LIVER TRANSAMINASE LEVELS: ICD-10-CM

## 2024-10-08 ENCOUNTER — APPOINTMENT (OUTPATIENT)
Dept: RHEUMATOLOGY | Facility: CLINIC | Age: 79
End: 2024-10-08
Payer: MEDICARE

## 2024-10-08 VITALS
HEIGHT: 56 IN | HEART RATE: 73 BPM | RESPIRATION RATE: 16 BRPM | DIASTOLIC BLOOD PRESSURE: 75 MMHG | BODY MASS INDEX: 33.07 KG/M2 | OXYGEN SATURATION: 98 % | WEIGHT: 147 LBS | SYSTOLIC BLOOD PRESSURE: 138 MMHG

## 2024-10-08 PROCEDURE — G2211 COMPLEX E/M VISIT ADD ON: CPT

## 2024-10-08 PROCEDURE — 99214 OFFICE O/P EST MOD 30 MIN: CPT

## 2024-10-10 LAB
ALBUMIN SERPL ELPH-MCNC: 4.5 G/DL
ALP BLD-CCNC: 91 U/L
ALT SERPL-CCNC: 33 U/L
ANION GAP SERPL CALC-SCNC: 13 MMOL/L
AST SERPL-CCNC: 23 U/L
BASOPHILS # BLD AUTO: 0.03 K/UL
BASOPHILS NFR BLD AUTO: 0.6 %
BILIRUB SERPL-MCNC: 0.3 MG/DL
BUN SERPL-MCNC: 22 MG/DL
C3 SERPL-MCNC: 161 MG/DL
C4 SERPL-MCNC: 29 MG/DL
CALCIUM SERPL-MCNC: 9.8 MG/DL
CHLORIDE SERPL-SCNC: 103 MMOL/L
CK SERPL-CCNC: 87 U/L
CO2 SERPL-SCNC: 23 MMOL/L
CREAT SERPL-MCNC: 0.76 MG/DL
EGFR: 81 ML/MIN/1.73M2
EOSINOPHIL # BLD AUTO: 0.11 K/UL
EOSINOPHIL NFR BLD AUTO: 2 %
GLUCOSE SERPL-MCNC: 93 MG/DL
HCT VFR BLD CALC: 40.5 %
HGB BLD-MCNC: 13.2 G/DL
IMM GRANULOCYTES NFR BLD AUTO: 0.6 %
LYMPHOCYTES # BLD AUTO: 2.01 K/UL
LYMPHOCYTES NFR BLD AUTO: 37.2 %
MAN DIFF?: NORMAL
MCHC RBC-ENTMCNC: 29.3 PG
MCHC RBC-ENTMCNC: 32.6 GM/DL
MCV RBC AUTO: 90 FL
MONOCYTES # BLD AUTO: 0.38 K/UL
MONOCYTES NFR BLD AUTO: 7 %
NEUTROPHILS # BLD AUTO: 2.84 K/UL
NEUTROPHILS NFR BLD AUTO: 52.6 %
PLATELET # BLD AUTO: 349 K/UL
POTASSIUM SERPL-SCNC: 4.5 MMOL/L
PROT SERPL-MCNC: 7.6 G/DL
RBC # BLD: 4.5 M/UL
RBC # FLD: 14.8 %
RHEUMATOID FACT SER QL: 21 IU/ML
SODIUM SERPL-SCNC: 139 MMOL/L
WBC # FLD AUTO: 5.4 K/UL

## 2024-10-18 ENCOUNTER — APPOINTMENT (OUTPATIENT)
Dept: ORTHOPEDIC SURGERY | Facility: CLINIC | Age: 79
End: 2024-10-18
Payer: MEDICARE

## 2024-10-18 VITALS
BODY MASS INDEX: 33.07 KG/M2 | SYSTOLIC BLOOD PRESSURE: 138 MMHG | DIASTOLIC BLOOD PRESSURE: 75 MMHG | WEIGHT: 147 LBS | HEART RATE: 73 BPM | HEIGHT: 56 IN

## 2024-10-18 DIAGNOSIS — M17.9 OSTEOARTHRITIS OF KNEE, UNSPECIFIED: ICD-10-CM

## 2024-10-18 PROCEDURE — 72170 X-RAY EXAM OF PELVIS: CPT

## 2024-10-18 PROCEDURE — 99203 OFFICE O/P NEW LOW 30 MIN: CPT

## 2024-10-18 PROCEDURE — 73564 X-RAY EXAM KNEE 4 OR MORE: CPT | Mod: 50

## 2024-10-18 RX ORDER — MELOXICAM 15 MG/1
15 TABLET ORAL DAILY
Qty: 30 | Refills: 1 | Status: ACTIVE | COMMUNITY
Start: 2024-10-18 | End: 1900-01-01

## 2024-11-20 ENCOUNTER — APPOINTMENT (OUTPATIENT)
Dept: INTERNAL MEDICINE | Facility: CLINIC | Age: 79
End: 2024-11-20

## 2024-11-20 ENCOUNTER — OUTPATIENT (OUTPATIENT)
Dept: OUTPATIENT SERVICES | Facility: HOSPITAL | Age: 79
LOS: 1 days | End: 2024-11-20

## 2024-11-20 VITALS
HEART RATE: 82 BPM | OXYGEN SATURATION: 98 % | BODY MASS INDEX: 32.96 KG/M2 | SYSTOLIC BLOOD PRESSURE: 128 MMHG | WEIGHT: 147 LBS | DIASTOLIC BLOOD PRESSURE: 86 MMHG

## 2024-11-20 DIAGNOSIS — R74.01 ELEVATION OF LEVELS OF LIVER TRANSAMINASE LEVELS: ICD-10-CM

## 2024-11-20 DIAGNOSIS — R91.8 OTHER NONSPECIFIC ABNORMAL FINDING OF LUNG FIELD: ICD-10-CM

## 2024-11-20 DIAGNOSIS — Z87.898 PERSONAL HISTORY OF OTHER SPECIFIED CONDITIONS: ICD-10-CM

## 2024-11-20 DIAGNOSIS — R94.6 ABNORMAL RESULTS OF THYROID FUNCTION STUDIES: ICD-10-CM

## 2024-11-20 DIAGNOSIS — M25.511 PAIN IN RIGHT SHOULDER: ICD-10-CM

## 2024-11-20 DIAGNOSIS — M85.80 OTHER SPECIFIED DISORDERS OF BONE DENSITY AND STRUCTURE, UNSPECIFIED SITE: ICD-10-CM

## 2024-11-20 DIAGNOSIS — M35.00 SICCA SYNDROME, UNSPECIFIED: ICD-10-CM

## 2024-11-20 DIAGNOSIS — E03.9 HYPOTHYROIDISM, UNSPECIFIED: ICD-10-CM

## 2024-11-20 DIAGNOSIS — Z98.49 CATARACT EXTRACTION STATUS, UNSPECIFIED EYE: Chronic | ICD-10-CM

## 2024-11-20 DIAGNOSIS — R73.09 OTHER ABNORMAL GLUCOSE: ICD-10-CM

## 2024-11-20 DIAGNOSIS — Z00.00 ENCOUNTER FOR GENERAL ADULT MEDICAL EXAMINATION W/OUT ABNORMAL FINDINGS: ICD-10-CM

## 2024-11-20 DIAGNOSIS — Z12.39 ENCOUNTER FOR OTHER SCREENING FOR MALIGNANT NEOPLASM OF BREAST: ICD-10-CM

## 2024-11-20 LAB
CHOLEST SERPL-MCNC: 159 MG/DL
ESTIMATED AVERAGE GLUCOSE: 123 MG/DL
HBA1C MFR BLD HPLC: 5.9 %
HDLC SERPL-MCNC: 39 MG/DL
LDLC SERPL CALC-MCNC: 99 MG/DL
NONHDLC SERPL-MCNC: 120 MG/DL
TRIGL SERPL-MCNC: 121 MG/DL
TSH SERPL-ACNC: 1.29 UIU/ML

## 2024-11-20 PROCEDURE — G0439: CPT

## 2024-11-20 PROCEDURE — G0010: CPT

## 2024-11-20 PROCEDURE — 90746 HEPB VACCINE 3 DOSE ADULT IM: CPT

## 2024-11-23 PROBLEM — R74.01 ELEVATED TRANSAMINASE LEVEL: Status: RESOLVED | Noted: 2024-09-18 | Resolved: 2024-11-23

## 2024-11-23 PROBLEM — Z12.39 BREAST SCREENING: Status: RESOLVED | Noted: 2023-11-16 | Resolved: 2024-11-23

## 2024-12-10 ENCOUNTER — APPOINTMENT (OUTPATIENT)
Dept: INTERNAL MEDICINE | Facility: CLINIC | Age: 79
End: 2024-12-10

## 2024-12-10 ENCOUNTER — OUTPATIENT (OUTPATIENT)
Dept: OUTPATIENT SERVICES | Facility: HOSPITAL | Age: 79
LOS: 1 days | End: 2024-12-10
Payer: COMMERCIAL

## 2024-12-10 VITALS
HEART RATE: 90 BPM | OXYGEN SATURATION: 98 % | BODY MASS INDEX: 33.07 KG/M2 | HEIGHT: 56 IN | SYSTOLIC BLOOD PRESSURE: 130 MMHG | DIASTOLIC BLOOD PRESSURE: 70 MMHG | TEMPERATURE: 100.3 F | WEIGHT: 147 LBS

## 2024-12-10 DIAGNOSIS — I10 ESSENTIAL (PRIMARY) HYPERTENSION: ICD-10-CM

## 2024-12-10 DIAGNOSIS — J06.9 ACUTE UPPER RESPIRATORY INFECTION, UNSPECIFIED: ICD-10-CM

## 2024-12-10 DIAGNOSIS — Z98.49 CATARACT EXTRACTION STATUS, UNSPECIFIED EYE: Chronic | ICD-10-CM

## 2024-12-10 PROCEDURE — G0463: CPT | Mod: 59

## 2024-12-10 PROCEDURE — 99214 OFFICE O/P EST MOD 30 MIN: CPT

## 2024-12-11 ENCOUNTER — NON-APPOINTMENT (OUTPATIENT)
Age: 79
End: 2024-12-11

## 2024-12-11 LAB
ALBUMIN SERPL ELPH-MCNC: 4.2 G/DL
ALP BLD-CCNC: 96 U/L
ALT SERPL-CCNC: 37 U/L
ANION GAP SERPL CALC-SCNC: 13 MMOL/L
AST SERPL-CCNC: 26 U/L
BILIRUB SERPL-MCNC: 0.4 MG/DL
BUN SERPL-MCNC: 10 MG/DL
CALCIUM SERPL-MCNC: 9.9 MG/DL
CHLORIDE SERPL-SCNC: 99 MMOL/L
CO2 SERPL-SCNC: 23 MMOL/L
CREAT SERPL-MCNC: 0.73 MG/DL
EGFR: 85 ML/MIN/1.73M2
GLUCOSE SERPL-MCNC: 109 MG/DL
HCT VFR BLD CALC: 40 %
HGB BLD-MCNC: 13.2 G/DL
MCHC RBC-ENTMCNC: 29.3 PG
MCHC RBC-ENTMCNC: 33 G/DL
MCV RBC AUTO: 88.9 FL
PLATELET # BLD AUTO: 336 K/UL
POTASSIUM SERPL-SCNC: 3.9 MMOL/L
PROT SERPL-MCNC: 7.6 G/DL
RAPID RVP RESULT: NOT DETECTED
RBC # BLD: 4.5 M/UL
RBC # FLD: 14.5 %
SARS-COV-2 RNA RESP QL NAA+PROBE: NOT DETECTED
SODIUM SERPL-SCNC: 136 MMOL/L
WBC # FLD AUTO: 7.8 K/UL

## 2024-12-16 DIAGNOSIS — J06.9 ACUTE UPPER RESPIRATORY INFECTION, UNSPECIFIED: ICD-10-CM

## 2024-12-18 ENCOUNTER — APPOINTMENT (OUTPATIENT)
Dept: INTERNAL MEDICINE | Facility: CLINIC | Age: 79
End: 2024-12-18

## 2024-12-20 ENCOUNTER — APPOINTMENT (OUTPATIENT)
Dept: ORTHOPEDIC SURGERY | Facility: CLINIC | Age: 79
End: 2024-12-20
Payer: MEDICARE

## 2024-12-20 VITALS — BODY MASS INDEX: 33.07 KG/M2 | HEIGHT: 56 IN | WEIGHT: 147 LBS

## 2024-12-20 DIAGNOSIS — M17.9 OSTEOARTHRITIS OF KNEE, UNSPECIFIED: ICD-10-CM

## 2024-12-20 PROCEDURE — 99213 OFFICE O/P EST LOW 20 MIN: CPT

## 2024-12-23 ENCOUNTER — APPOINTMENT (OUTPATIENT)
Dept: INTERNAL MEDICINE | Facility: CLINIC | Age: 79
End: 2024-12-23
Payer: MEDICARE

## 2024-12-23 ENCOUNTER — OUTPATIENT (OUTPATIENT)
Dept: OUTPATIENT SERVICES | Facility: HOSPITAL | Age: 79
LOS: 1 days | End: 2024-12-23
Payer: COMMERCIAL

## 2024-12-23 VITALS
DIASTOLIC BLOOD PRESSURE: 80 MMHG | BODY MASS INDEX: 32.17 KG/M2 | WEIGHT: 143 LBS | HEIGHT: 56 IN | SYSTOLIC BLOOD PRESSURE: 140 MMHG | OXYGEN SATURATION: 97 % | HEART RATE: 79 BPM

## 2024-12-23 DIAGNOSIS — I10 ESSENTIAL (PRIMARY) HYPERTENSION: ICD-10-CM

## 2024-12-23 DIAGNOSIS — K21.9 GASTRO-ESOPHAGEAL REFLUX DISEASE W/OUT ESOPHAGITIS: ICD-10-CM

## 2024-12-23 DIAGNOSIS — J06.9 ACUTE UPPER RESPIRATORY INFECTION, UNSPECIFIED: ICD-10-CM

## 2024-12-23 PROCEDURE — G0463: CPT

## 2024-12-23 PROCEDURE — 99214 OFFICE O/P EST MOD 30 MIN: CPT

## 2024-12-23 PROCEDURE — G0010: CPT

## 2024-12-23 PROCEDURE — G2211 COMPLEX E/M VISIT ADD ON: CPT

## 2024-12-23 PROCEDURE — 90746 HEPB VACCINE 3 DOSE ADULT IM: CPT

## 2024-12-24 ENCOUNTER — APPOINTMENT (OUTPATIENT)
Dept: INTERNAL MEDICINE | Facility: CLINIC | Age: 79
End: 2024-12-24

## 2024-12-30 DIAGNOSIS — Z23 ENCOUNTER FOR IMMUNIZATION: ICD-10-CM

## 2024-12-30 DIAGNOSIS — J06.9 ACUTE UPPER RESPIRATORY INFECTION, UNSPECIFIED: ICD-10-CM

## 2024-12-30 DIAGNOSIS — K21.9 GASTRO-ESOPHAGEAL REFLUX DISEASE WITHOUT ESOPHAGITIS: ICD-10-CM

## 2025-01-02 ENCOUNTER — OUTPATIENT (OUTPATIENT)
Dept: OUTPATIENT SERVICES | Facility: HOSPITAL | Age: 80
LOS: 1 days | End: 2025-01-02

## 2025-01-02 ENCOUNTER — APPOINTMENT (OUTPATIENT)
Dept: INTERNAL MEDICINE | Facility: CLINIC | Age: 80
End: 2025-01-02

## 2025-01-02 ENCOUNTER — APPOINTMENT (OUTPATIENT)
Dept: INTERNAL MEDICINE | Facility: CLINIC | Age: 80
End: 2025-01-02
Payer: MEDICARE

## 2025-01-02 VITALS
HEART RATE: 83 BPM | DIASTOLIC BLOOD PRESSURE: 82 MMHG | BODY MASS INDEX: 32.06 KG/M2 | OXYGEN SATURATION: 98 % | SYSTOLIC BLOOD PRESSURE: 156 MMHG | WEIGHT: 143 LBS

## 2025-01-02 VITALS — SYSTOLIC BLOOD PRESSURE: 140 MMHG | DIASTOLIC BLOOD PRESSURE: 85 MMHG

## 2025-01-02 DIAGNOSIS — Z98.49 CATARACT EXTRACTION STATUS, UNSPECIFIED EYE: Chronic | ICD-10-CM

## 2025-01-02 DIAGNOSIS — R19.7 DIARRHEA, UNSPECIFIED: ICD-10-CM

## 2025-01-02 PROCEDURE — 99214 OFFICE O/P EST MOD 30 MIN: CPT | Mod: GC

## 2025-01-03 LAB
ALBUMIN SERPL ELPH-MCNC: 4.7 G/DL
ALP BLD-CCNC: 105 U/L
ALT SERPL-CCNC: 30 U/L
ANION GAP SERPL CALC-SCNC: 12 MMOL/L
AST SERPL-CCNC: 25 U/L
BASOPHILS # BLD AUTO: 0.03 K/UL
BASOPHILS NFR BLD AUTO: 0.5 %
BILIRUB SERPL-MCNC: 0.3 MG/DL
BUN SERPL-MCNC: 13 MG/DL
CALCIUM SERPL-MCNC: 10.3 MG/DL
CHLORIDE SERPL-SCNC: 101 MMOL/L
CO2 SERPL-SCNC: 26 MMOL/L
CREAT SERPL-MCNC: 0.73 MG/DL
EGFR: 85 ML/MIN/1.73M2
EOSINOPHIL # BLD AUTO: 0.1 K/UL
EOSINOPHIL NFR BLD AUTO: 1.5 %
GLUCOSE SERPL-MCNC: 93 MG/DL
HCT VFR BLD CALC: 42 %
HGB BLD-MCNC: 13.5 G/DL
IMM GRANULOCYTES NFR BLD AUTO: 0.2 %
LYMPHOCYTES # BLD AUTO: 2.52 K/UL
LYMPHOCYTES NFR BLD AUTO: 38.8 %
MAN DIFF?: NORMAL
MCHC RBC-ENTMCNC: 28.7 PG
MCHC RBC-ENTMCNC: 32.1 G/DL
MCV RBC AUTO: 89.2 FL
MONOCYTES # BLD AUTO: 0.45 K/UL
MONOCYTES NFR BLD AUTO: 6.9 %
NEUTROPHILS # BLD AUTO: 3.39 K/UL
NEUTROPHILS NFR BLD AUTO: 52.1 %
PLATELET # BLD AUTO: 416 K/UL
POTASSIUM SERPL-SCNC: 3.9 MMOL/L
PROT SERPL-MCNC: 8.2 G/DL
RBC # BLD: 4.71 M/UL
RBC # FLD: 14.4 %
SODIUM SERPL-SCNC: 139 MMOL/L
WBC # FLD AUTO: 6.5 K/UL

## 2025-01-07 ENCOUNTER — APPOINTMENT (OUTPATIENT)
Dept: ULTRASOUND IMAGING | Facility: CLINIC | Age: 80
End: 2025-01-07
Payer: MEDICARE

## 2025-01-07 ENCOUNTER — OUTPATIENT (OUTPATIENT)
Dept: OUTPATIENT SERVICES | Facility: HOSPITAL | Age: 80
LOS: 1 days | End: 2025-01-07
Payer: MEDICARE

## 2025-01-07 DIAGNOSIS — Z98.49 CATARACT EXTRACTION STATUS, UNSPECIFIED EYE: Chronic | ICD-10-CM

## 2025-01-07 DIAGNOSIS — R94.6 ABNORMAL RESULTS OF THYROID FUNCTION STUDIES: ICD-10-CM

## 2025-01-07 LAB — CDIFF BY PCR: NOT DETECTED

## 2025-01-07 PROCEDURE — 87798 DETECT AGENT NOS DNA AMP: CPT

## 2025-01-07 PROCEDURE — 87507 IADNA-DNA/RNA PROBE TQ 12-25: CPT

## 2025-01-07 PROCEDURE — 76536 US EXAM OF HEAD AND NECK: CPT

## 2025-01-07 PROCEDURE — 76536 US EXAM OF HEAD AND NECK: CPT | Mod: 26

## 2025-01-08 LAB — GI PCR PANEL: NOT DETECTED

## 2025-01-09 ENCOUNTER — NON-APPOINTMENT (OUTPATIENT)
Age: 80
End: 2025-01-09

## 2025-01-09 LAB
Lab: NOT DETECTED
NOROVIRUS GI AG STL QL: NOT DETECTED

## 2025-01-10 DIAGNOSIS — I10 ESSENTIAL (PRIMARY) HYPERTENSION: ICD-10-CM

## 2025-03-05 ENCOUNTER — NON-APPOINTMENT (OUTPATIENT)
Age: 80
End: 2025-03-05

## 2025-03-05 ENCOUNTER — APPOINTMENT (OUTPATIENT)
Dept: GASTROENTEROLOGY | Facility: CLINIC | Age: 80
End: 2025-03-05
Payer: MEDICARE

## 2025-03-05 VITALS — HEART RATE: 89 BPM | OXYGEN SATURATION: 97 % | WEIGHT: 144 LBS | HEIGHT: 56 IN | BODY MASS INDEX: 32.39 KG/M2

## 2025-03-05 DIAGNOSIS — K21.9 GASTRO-ESOPHAGEAL REFLUX DISEASE W/OUT ESOPHAGITIS: ICD-10-CM

## 2025-03-05 PROCEDURE — 99213 OFFICE O/P EST LOW 20 MIN: CPT

## 2025-03-05 RX ORDER — FAMOTIDINE 20 MG/1
20 TABLET, FILM COATED ORAL TWICE DAILY
Qty: 180 | Refills: 1 | Status: ACTIVE | COMMUNITY
Start: 2025-03-05 | End: 1900-01-01

## 2025-04-08 ENCOUNTER — NON-APPOINTMENT (OUTPATIENT)
Age: 80
End: 2025-04-08

## 2025-04-08 ENCOUNTER — APPOINTMENT (OUTPATIENT)
Dept: OPHTHALMOLOGY | Facility: CLINIC | Age: 80
End: 2025-04-08
Payer: MEDICARE

## 2025-04-08 PROCEDURE — 92133 CPTRZD OPH DX IMG PST SGM ON: CPT

## 2025-04-08 PROCEDURE — 92014 COMPRE OPH EXAM EST PT 1/>: CPT

## 2025-04-08 PROCEDURE — 92083 EXTENDED VISUAL FIELD XM: CPT

## 2025-04-16 ENCOUNTER — APPOINTMENT (OUTPATIENT)
Dept: GASTROENTEROLOGY | Facility: CLINIC | Age: 80
End: 2025-04-16

## 2025-05-08 ENCOUNTER — APPOINTMENT (OUTPATIENT)
Dept: DERMATOLOGY | Facility: CLINIC | Age: 80
End: 2025-05-08
Payer: MEDICARE

## 2025-05-08 DIAGNOSIS — D48.9 NEOPLASM OF UNCERTAIN BEHAVIOR, UNSPECIFIED: ICD-10-CM

## 2025-05-08 DIAGNOSIS — L82.1 OTHER SEBORRHEIC KERATOSIS: ICD-10-CM

## 2025-05-08 DIAGNOSIS — L81.4 OTHER MELANIN HYPERPIGMENTATION: ICD-10-CM

## 2025-05-08 PROCEDURE — 99203 OFFICE O/P NEW LOW 30 MIN: CPT

## 2025-06-04 DIAGNOSIS — I10 ESSENTIAL (PRIMARY) HYPERTENSION: ICD-10-CM

## 2025-07-15 ENCOUNTER — APPOINTMENT (OUTPATIENT)
Dept: DERMATOLOGY | Facility: CLINIC | Age: 80
End: 2025-07-15
Payer: MEDICARE

## 2025-07-15 PROBLEM — D48.5 NEOPLASM OF UNCERTAIN BEHAVIOR OF SKIN OF CHEEK: Status: ACTIVE | Noted: 2025-07-15

## 2025-07-15 PROCEDURE — 11102 TANGNTL BX SKIN SINGLE LES: CPT

## 2025-08-08 ENCOUNTER — APPOINTMENT (OUTPATIENT)
Dept: DERMATOLOGY | Facility: CLINIC | Age: 80
End: 2025-08-08
Payer: MEDICARE

## 2025-08-08 DIAGNOSIS — L68.0 HIRSUTISM: ICD-10-CM

## 2025-08-08 DIAGNOSIS — L81.4 OTHER MELANIN HYPERPIGMENTATION: ICD-10-CM

## 2025-08-08 PROCEDURE — 99214 OFFICE O/P EST MOD 30 MIN: CPT
